# Patient Record
Sex: FEMALE | Race: WHITE | Employment: OTHER | URBAN - METROPOLITAN AREA
[De-identification: names, ages, dates, MRNs, and addresses within clinical notes are randomized per-mention and may not be internally consistent; named-entity substitution may affect disease eponyms.]

---

## 2017-02-20 ENCOUNTER — OFFICE VISIT (OUTPATIENT)
Dept: NEUROLOGY | Facility: CLINIC | Age: 77
End: 2017-02-20

## 2017-02-20 VITALS
WEIGHT: 220 LBS | SYSTOLIC BLOOD PRESSURE: 104 MMHG | BODY MASS INDEX: 36.65 KG/M2 | RESPIRATION RATE: 12 BRPM | DIASTOLIC BLOOD PRESSURE: 74 MMHG | HEIGHT: 65 IN | HEART RATE: 60 BPM

## 2017-02-20 DIAGNOSIS — R41.3 MEMORY DEFICITS: Primary | ICD-10-CM

## 2017-02-20 DIAGNOSIS — R26.9 GAIT DISORDER: ICD-10-CM

## 2017-02-20 DIAGNOSIS — F01.50 VASCULAR DEMENTIA WITHOUT BEHAVIORAL DISTURBANCE (HCC): ICD-10-CM

## 2017-02-20 DIAGNOSIS — E53.8 B12 DEFICIENCY: ICD-10-CM

## 2017-02-20 DIAGNOSIS — R70.0 ESR RAISED: ICD-10-CM

## 2017-02-20 PROCEDURE — 99214 OFFICE O/P EST MOD 30 MIN: CPT | Performed by: OTHER

## 2017-02-20 RX ORDER — DONEPEZIL HYDROCHLORIDE 10 MG/1
TABLET, FILM COATED ORAL
Qty: 180 TABLET | Refills: 3 | Status: SHIPPED | OUTPATIENT
Start: 2017-02-20 | End: 2018-04-10

## 2017-02-20 NOTE — PROGRESS NOTES
Mate[address]27   Neurology; follow up  CLINIC VISIT  2017    Dorothea Genny Patient Status:  No patient class for patient encounter    11/10/1940 MRN [id number]   Location [address] [place] [address] Gustavo Burnett MD     REASON Right     OTHER SURGICAL HISTORY      REPAIR ROTATOR CUFF,ACUTE      Comment right shoulder    APPENDECTOMY      ANGIOPLASTY (CORONARY)  10/24/07    Comment 90% LADp (3.0x16mm Taxus), 90% D1 (2.5x12mm Vision)    ANGIOPLASTY (CORONARY)  10/29/07    Comment Disp:  Rfl:    insulin aspart 100 UNIT/ML Subcutaneous Solution Inject into the skin 3 (three) times daily before meals.  Per SS:130-179=25 mwwpr371-507=24 hshle444-562=10 pvbvk443-492=82 cyide479-004=62 ljvhd368-783=09 units Disp:  Rfl:    furosemide (LASI 220 lb  BMI 36.61 kg/m2   Body mass index is 36.61 kg/(m^2). General:  Patient is a 67year old female in no acute distress. appearance: Normal developed and well nourished , in no acute stress;   HEENT:  Normal conjunctiva, no abnormal secretion,   Neck ENAS    SED SHIREEN FLORES (AUTOMATED)    RHEUMATOID ARTHRITIS FACTOR    Gait disorder    B12 deficiency    Relevant Orders    VITAMIN B12    ESR raised          Impression/Plan:  (R41.3) Memory deficits  (primary encounter diagnosis)    (F01.50) Vascula

## 2017-02-20 NOTE — PATIENT INSTRUCTIONS
Refill policies:    • Allow 2 business days for refills; controlled substances may take longer.   • Contact your pharmacy at least 5 days prior to running out of medication and have them send an electronic request or submit request through the “request re your physician has recommended that you have a procedure or additional testing performed. [facility] will contact your insurance carrier to obtain pre-certification or prior authorization.     Unfortunately, BENITA has seen an increas

## 2017-02-21 ENCOUNTER — TELEPHONE (OUTPATIENT)
Dept: NEUROLOGY | Facility: CLINIC | Age: 77
End: 2017-02-21

## 2017-02-21 NOTE — TELEPHONE ENCOUNTER
Clarified Donepezil dosage and directions per note below with Warner Grimes , pharmacist at [address] [facility]. Verbalized understanding.

## 2017-02-21 NOTE — TELEPHONE ENCOUNTER
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

## 2017-02-22 PROBLEM — R60.9 EDEMA: Status: ACTIVE | Noted: 2017-02-22

## 2017-02-23 ENCOUNTER — TELEPHONE (OUTPATIENT)
Dept: NEUROLOGY | Facility: CLINIC | Age: 77
End: 2017-02-23

## 2017-02-23 NOTE — TELEPHONE ENCOUNTER
Dr. Baron Ramírez reviewed test results performed 2/22/17. With elevated ESR (95) and negative DEEJAY, has recommended follow up with PCP prior to consulting rheumatologist.  ESR was 85 on 7/30/16.     Spoke with patient's daughter, Stacie Salter, and urged her to follow up

## 2017-03-14 ENCOUNTER — HOSPITAL ENCOUNTER (OUTPATIENT)
Dept: CV DIAGNOSTICS | Facility: HOSPITAL | Age: 77
Discharge: HOME | End: 2017-03-14
Attending: INTERNAL MEDICINE
Payer: MEDICARE

## 2017-03-14 DIAGNOSIS — I35.1 NONRHEUMATIC AORTIC VALVE INSUFFICIENCY: ICD-10-CM

## 2017-03-14 DIAGNOSIS — R60.0 LOCALIZED EDEMA: ICD-10-CM

## 2017-03-14 DIAGNOSIS — I50.20 SYSTOLIC CONGESTIVE HEART FAILURE, UNSPECIFIED CONGESTIVE HEART FAILURE CHRONICITY: ICD-10-CM

## 2017-03-14 PROCEDURE — 93306 TTE W/DOPPLER COMPLETE: CPT

## 2017-03-14 PROCEDURE — 93306 TTE W/DOPPLER COMPLETE: CPT | Performed by: INTERNAL MEDICINE

## 2017-06-19 ENCOUNTER — HOSPITAL ENCOUNTER (EMERGENCY)
Facility: HOSPITAL | Age: 77
Discharge: OTHER INSTITUTION | End: 2017-06-20
Attending: EMERGENCY MEDICINE
Payer: MEDICARE

## 2017-06-19 DIAGNOSIS — T83.021A DISLODGED FOLEY CATHETER, INITIAL ENCOUNTER: ICD-10-CM
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
## 2017-06-19 PROCEDURE — 99284 EMERGENCY DEPT VISIT MOD MDM: CPT

## 2017-06-19 PROCEDURE — 87186 SC STD MICRODIL/AGAR DIL: CPT | Performed by: EMERGENCY MEDICINE

## 2017-06-19 PROCEDURE — 87086 URINE CULTURE/COLONY COUNT: CPT | Performed by: EMERGENCY MEDICINE

## 2017-06-19 PROCEDURE — 81001 URINALYSIS AUTO W/SCOPE: CPT | Performed by: EMERGENCY MEDICINE

## 2017-06-19 PROCEDURE — 87077 CULTURE AEROBIC IDENTIFY: CPT | Performed by: EMERGENCY MEDICINE

## 2017-06-19 PROCEDURE — 51702 INSERT TEMP BLADDER CATH: CPT

## 2017-06-20 ENCOUNTER — APPOINTMENT (OUTPATIENT)
Dept: CT IMAGING | Facility: HOSPITAL | Age: 77
End: 2017-06-20
Attending: EMERGENCY MEDICINE
Payer: MEDICARE

## 2017-06-20 VITALS
OXYGEN SATURATION: 96 % | BODY MASS INDEX: 39.99 KG/M2 | TEMPERATURE: 97 F | SYSTOLIC BLOOD PRESSURE: 149 MMHG | RESPIRATION RATE: 16 BRPM | HEART RATE: 52 BPM | HEIGHT: 65 IN | WEIGHT: 240 LBS | DIASTOLIC BLOOD PRESSURE: 82 MMHG

## 2017-06-20 PROCEDURE — 72125 CT NECK SPINE W/O DYE: CPT | Performed by: EMERGENCY MEDICINE

## 2017-06-20 PROCEDURE — 70450 CT HEAD/BRAIN W/O DYE: CPT | Performed by: EMERGENCY MEDICINE

## 2017-06-20 NOTE — ED PROVIDER NOTES
Patient Seen in: [facility] Emergency Department    History   Patient presents with:  Cath Tube Problem (gastrointestinal, urinary, integumentary)    Stated Complaint: mooney catheter issue    HPI    Patient is a 54-year-old with a history of hyperte blood pressure    • High cholesterol    • Coronary atherosclerosis            Past Surgical History    BACK SURGERY      TOTAL ABDOM HYSTERECTOMY      CHOLECYSTECTOMY      KNEE REPLACEMENT SURGERY Right     OTHER SURGICAL HISTORY      REPAIR ROTATOR CUFF,A Tab,  Take 25 mg by mouth nightly. Cholecalciferol (VITAMIN D) 2000 UNITS Oral Cap,  Take 2,000 Units by mouth every morning. Potassium Chloride ER (K-DUR) 10 MEQ Oral Tab CR,  Take 10 mEq by mouth 2 (two) times daily.    Montelukast Sodium (SINGULAIR bilaterally. Abdomen: Soft, nontender, nondistended. Back: No thoracic or lumbar tenderness. Extremities: No edema. Neuro: No facial droop. Patient moves all extremities.     ED Course     Labs Reviewed   URINALYSIS WITH CULTURE REFLEX - Abnormal; No [address] [facility] [address]  [phone]    Schedule an appointment as soon as possible for a visit in 2 days        Medications Prescribed:  Current Discharge Medication List

## 2017-06-20 NOTE — ED NOTES
Report given to Pattie Guerra, [address] [phone] at [address] [facility].

## 2017-06-20 NOTE — ED NOTES
Patient and daughter at bedside updated with plan of care - re: Pt for CT, estimated time for tests and results also relayed. Both verbalized understanding.

## 2017-06-20 NOTE — ED NOTES
Clarified with Pt's daughter at bedside - Pt's mooney cath fell off tonight at 2100 while Pt was in the shower, no trauma/injury or fall tonight. Pt's current bruises were sustained last week when Pt fell.

## 2017-06-20 NOTE — ED NOTES
Discharge instructions discussed with Patient and daughter-Monica at bedside, both verbalized understanding. Both were also updated with KAI VALENCIA. Pt was assisted by this RN and ED PCT-Irene in changing back into her own clothes.

## 2017-08-04 ENCOUNTER — HOSPITAL ENCOUNTER (EMERGENCY)
Facility: HOSPITAL | Age: 77
Discharge: NURSING, REHAB OR LONG-TERM CARE | End: 2017-08-05
Attending: EMERGENCY MEDICINE
Payer: MEDICARE

## 2017-08-04 VITALS
SYSTOLIC BLOOD PRESSURE: 171 MMHG | BODY MASS INDEX: 40 KG/M2 | RESPIRATION RATE: 18 BRPM | OXYGEN SATURATION: 95 % | WEIGHT: 238.13 LBS | DIASTOLIC BLOOD PRESSURE: 67 MMHG | TEMPERATURE: 98 F | HEART RATE: 52 BPM

## 2017-08-04 DIAGNOSIS — T83.9XXA FOLEY CATHETER PROBLEM, INITIAL ENCOUNTER (HCC): Primary | ICD-10-CM

## 2017-08-04 PROCEDURE — 99283 EMERGENCY DEPT VISIT LOW MDM: CPT

## 2017-08-05 NOTE — ED PROVIDER NOTES
Patient Seen in: [facility] Emergency Department    History   Patient presents with:  Cath Tube Problem (gastrointestinal, urinary, integumentary)    Stated Complaint: Catheter problem    HPI    Patient is a 69-year-old female comes in emergency fer date:  APPENDECTOMY  No date: BACK SURGERY  No date: CHOLECYSTECTOMY  No date: HYSTERECTOMY  No date: KNEE REPLACEMENT SURGERY Right  No date: OTHER SURGICAL HISTORY  No date: REPAIR ROTATOR CUFF,ACUTE      Comment: right shoulder  No date: TOTAL ABDOM HYST Sodium (SINGULAIR) 10 MG Oral Tab,  Take 10 mg by mouth nightly. aspirin 325 MG Oral Tab,  Take 325 mg by mouth daily.    insulin glargine (LANTUS) 100 UNIT/ML Subcutaneous Solution,  Inject 45 Units into the skin nightly.     gabapentin (NEURONTIN) 300 M edema.   SKIN EXAMINATIoN: Warm and dry with normal appearance. No rashes or lesions. Patient has chronic stasis dermatitis bilateral lower extremities per  NEUROLOGICAL:  Motor strength intact all groups.   normal sensation, speech intact    ED Course

## 2017-08-11 ENCOUNTER — HOSPITAL ENCOUNTER (EMERGENCY)
Facility: HOSPITAL | Age: 77
Discharge: HOME | End: 2017-08-11
Attending: EMERGENCY MEDICINE
Payer: MEDICARE

## 2017-08-11 VITALS
RESPIRATION RATE: 18 BRPM | DIASTOLIC BLOOD PRESSURE: 72 MMHG | HEART RATE: 92 BPM | OXYGEN SATURATION: 96 % | TEMPERATURE: 98 F | SYSTOLIC BLOOD PRESSURE: 138 MMHG

## 2017-08-11 DIAGNOSIS — N39.0 URINARY TRACT INFECTION WITHOUT HEMATURIA, SITE UNSPECIFIED: ICD-10-CM

## 2017-08-11 DIAGNOSIS — T83.9XXA FOLEY CATHETER PROBLEM, INITIAL ENCOUNTER (HCC): Primary | ICD-10-CM

## 2017-08-11 LAB
BILIRUB UR QL STRIP.AUTO: NEGATIVE
COLOR UR AUTO: YELLOW
GLUCOSE UR STRIP.AUTO-MCNC: 50 MG/DL
HYALINE CASTS #/AREA URNS AUTO: PRESENT /LPF
KETONES UR STRIP.AUTO-MCNC: NEGATIVE MG/DL
NITRITE UR QL STRIP.AUTO: NEGATIVE
PH UR STRIP.AUTO: 6 [PH] (ref 4.5–8)
PROT UR STRIP.AUTO-MCNC: 100 MG/DL
RBC UR QL AUTO: NEGATIVE
SP GR UR STRIP.AUTO: 1.01 (ref 1–1.03)
UROBILINOGEN UR STRIP.AUTO-MCNC: <2 MG/DL
WBC #/AREA URNS AUTO: >50 /HPF

## 2017-08-11 PROCEDURE — 81001 URINALYSIS AUTO W/SCOPE: CPT | Performed by: EMERGENCY MEDICINE

## 2017-08-11 PROCEDURE — 87077 CULTURE AEROBIC IDENTIFY: CPT | Performed by: EMERGENCY MEDICINE

## 2017-08-11 PROCEDURE — 87186 SC STD MICRODIL/AGAR DIL: CPT | Performed by: EMERGENCY MEDICINE

## 2017-08-11 PROCEDURE — 87086 URINE CULTURE/COLONY COUNT: CPT | Performed by: EMERGENCY MEDICINE

## 2017-08-11 PROCEDURE — 99283 EMERGENCY DEPT VISIT LOW MDM: CPT

## 2017-08-11 PROCEDURE — 51702 INSERT TEMP BLADDER CATH: CPT

## 2017-08-11 RX ORDER — CEPHALEXIN 250 MG/1
250 CAPSULE ORAL 2 TIMES DAILY
Qty: 6 CAPSULE | Refills: 0 | Status: SHIPPED | OUTPATIENT
Start: 2017-08-11 | End: 2017-08-14

## 2017-08-11 NOTE — ED PROVIDER NOTES
Patient Seen in: [facility] Emergency Department    History   Patient presents with:  Cath Tube Problem (gastrointestinal, urinary, integumentary)    Stated Complaint: needs mooney cath    HPI    The patient is a 53-year-old female who has had a long REPLACEMENT    Medications :   cephALEXin 250 MG Oral Cap,  Take 1 capsule (250 mg total) by mouth 2 (two) times daily.    CEPHALEXIN 250 MG Oral Cap,  TAKE ONE CAPSULE BY MOUTH PRIOR TO CATHETER CHANGE AND ONE CAPSULE BY MOUTH AFTER CATHETER CHANGE   NOVA Units into the skin nightly.     gabapentin (NEURONTIN) 300 MG Oral Cap,  Take 300 mg by mouth 3 (three) times daily.          Family History   Problem Relation Age of Onset   • Diabetes Father    • Diabetes Sister    • Stroke Son        Smoking status: Meaghan Gómez (*)     Leukocyte Esterase Urine Large (*)     WBC Urine >50 (*)     RBC URINE 3-5 (*)     Bacteria Urine 3+ (*)     Squamous Epi.  Cells Large (*)     Hyaline Casts Present (*)     All other components within normal limits   URINE CULTURE, ROUTINE       ==

## 2017-08-19 DIAGNOSIS — E53.8 B12 DEFICIENCY: ICD-10-CM

## 2017-08-21 RX ORDER — CYANOCOBALAMIN 1000 UG/ML
INJECTION INTRAMUSCULAR; SUBCUTANEOUS
Qty: 1 VIAL | Refills: 11 | Status: SHIPPED | OUTPATIENT
Start: 2017-08-21 | End: 2018-04-10

## 2017-08-21 NOTE — TELEPHONE ENCOUNTER
Lab on 2/22- B12 was at 615 level.      Medication: Vitamin b12 injection    Date of last refill: 11/15/2016  [place]     Last office visit: 2/20/17  Due back to clinic per last office note:  6 months  Date next office vi

## 2017-08-30 PROBLEM — E78.5 DYSLIPIDEMIA: Status: ACTIVE | Noted: 2017-08-30

## 2017-12-18 ENCOUNTER — TELEPHONE (OUTPATIENT)
Dept: NEUROLOGY | Facility: CLINIC | Age: 77
End: 2017-12-18

## 2018-02-12 NOTE — TELEPHONE ENCOUNTER
Sent patient a [employer] message to please make appointment       Medication: Donepezil 10mg     Date of last refill: 2/20/17  [place]     Last office visit: 2/20/17  Due back to clinic per last office note:  6 months  Elias

## 2018-02-20 RX ORDER — DONEPEZIL HYDROCHLORIDE 10 MG/1
TABLET, FILM COATED ORAL
Qty: 60 TABLET | OUTPATIENT
Start: 2018-02-20

## 2018-02-20 NOTE — TELEPHONE ENCOUNTER
Patient has not read [employer] message. Per daughter, Harry Jerez, refill is to go to Dr Lisa Ellington. Medication denied with note to send to PCP.

## 2018-04-10 ENCOUNTER — APPOINTMENT (OUTPATIENT)
Dept: CT IMAGING | Facility: HOSPITAL | Age: 78
DRG: 690 | End: 2018-04-10
Attending: EMERGENCY MEDICINE
Payer: MEDICARE

## 2018-04-10 ENCOUNTER — HOSPITAL ENCOUNTER (INPATIENT)
Facility: HOSPITAL | Age: 78
LOS: 1 days | Discharge: NURSING, REHAB OR LONG-TERM CARE | DRG: 690 | End: 2018-04-11
Attending: EMERGENCY MEDICINE | Admitting: INTERNAL MEDICINE
Payer: MEDICARE

## 2018-04-10 ENCOUNTER — APPOINTMENT (OUTPATIENT)
Dept: GENERAL RADIOLOGY | Facility: HOSPITAL | Age: 78
DRG: 690 | End: 2018-04-10
Attending: EMERGENCY MEDICINE
Payer: MEDICARE

## 2018-04-10 DIAGNOSIS — R53.83 LETHARGY: Primary | ICD-10-CM

## 2018-04-10 DIAGNOSIS — N30.00 ACUTE CYSTITIS WITHOUT HEMATURIA: ICD-10-CM

## 2018-04-10 DIAGNOSIS — E16.1 HYPOGLYCEMIC REACTION: ICD-10-CM

## 2018-04-10 PROBLEM — R79.89 AZOTEMIA: Status: ACTIVE | Noted: 2018-04-10

## 2018-04-10 PROBLEM — R73.9 HYPERGLYCEMIA: Status: ACTIVE | Noted: 2018-04-10

## 2018-04-10 PROBLEM — E87.3 METABOLIC ALKALOSIS: Status: ACTIVE | Noted: 2018-04-10

## 2018-04-10 PROBLEM — E87.2 RESPIRATORY ACIDOSIS: Status: ACTIVE | Noted: 2018-04-10

## 2018-04-10 PROCEDURE — 81015 MICROSCOPIC EXAM OF URINE: CPT | Performed by: EMERGENCY MEDICINE

## 2018-04-10 PROCEDURE — 84132 ASSAY OF SERUM POTASSIUM: CPT | Performed by: EMERGENCY MEDICINE

## 2018-04-10 PROCEDURE — 96365 THER/PROPH/DIAG IV INF INIT: CPT

## 2018-04-10 PROCEDURE — 87040 BLOOD CULTURE FOR BACTERIA: CPT | Performed by: EMERGENCY MEDICINE

## 2018-04-10 PROCEDURE — 84145 PROCALCITONIN (PCT): CPT | Performed by: EMERGENCY MEDICINE

## 2018-04-10 PROCEDURE — 93005 ELECTROCARDIOGRAM TRACING: CPT

## 2018-04-10 PROCEDURE — 36600 WITHDRAWAL OF ARTERIAL BLOOD: CPT | Performed by: EMERGENCY MEDICINE

## 2018-04-10 PROCEDURE — 70450 CT HEAD/BRAIN W/O DYE: CPT | Performed by: EMERGENCY MEDICINE

## 2018-04-10 PROCEDURE — 83605 ASSAY OF LACTIC ACID: CPT | Performed by: EMERGENCY MEDICINE

## 2018-04-10 PROCEDURE — 82962 GLUCOSE BLOOD TEST: CPT

## 2018-04-10 PROCEDURE — 93010 ELECTROCARDIOGRAM REPORT: CPT

## 2018-04-10 PROCEDURE — 85610 PROTHROMBIN TIME: CPT | Performed by: EMERGENCY MEDICINE

## 2018-04-10 PROCEDURE — 83050 HGB METHEMOGLOBIN QUAN: CPT | Performed by: EMERGENCY MEDICINE

## 2018-04-10 PROCEDURE — 87186 SC STD MICRODIL/AGAR DIL: CPT | Performed by: EMERGENCY MEDICINE

## 2018-04-10 PROCEDURE — 85730 THROMBOPLASTIN TIME PARTIAL: CPT | Performed by: EMERGENCY MEDICINE

## 2018-04-10 PROCEDURE — 82803 BLOOD GASES ANY COMBINATION: CPT | Performed by: EMERGENCY MEDICINE

## 2018-04-10 PROCEDURE — 99285 EMERGENCY DEPT VISIT HI MDM: CPT

## 2018-04-10 PROCEDURE — 81001 URINALYSIS AUTO W/SCOPE: CPT | Performed by: EMERGENCY MEDICINE

## 2018-04-10 PROCEDURE — 82330 ASSAY OF CALCIUM: CPT | Performed by: EMERGENCY MEDICINE

## 2018-04-10 PROCEDURE — 87086 URINE CULTURE/COLONY COUNT: CPT | Performed by: EMERGENCY MEDICINE

## 2018-04-10 PROCEDURE — 85025 COMPLETE CBC W/AUTO DIFF WBC: CPT | Performed by: EMERGENCY MEDICINE

## 2018-04-10 PROCEDURE — 84295 ASSAY OF SERUM SODIUM: CPT | Performed by: EMERGENCY MEDICINE

## 2018-04-10 PROCEDURE — 82375 ASSAY CARBOXYHB QUANT: CPT | Performed by: EMERGENCY MEDICINE

## 2018-04-10 PROCEDURE — 71045 X-RAY EXAM CHEST 1 VIEW: CPT | Performed by: EMERGENCY MEDICINE

## 2018-04-10 PROCEDURE — 87077 CULTURE AEROBIC IDENTIFY: CPT | Performed by: EMERGENCY MEDICINE

## 2018-04-10 PROCEDURE — 80053 COMPREHEN METABOLIC PANEL: CPT | Performed by: EMERGENCY MEDICINE

## 2018-04-10 PROCEDURE — 85018 HEMOGLOBIN: CPT | Performed by: EMERGENCY MEDICINE

## 2018-04-10 RX ORDER — SIMVASTATIN 20 MG
10 TABLET ORAL NIGHTLY
COMMUNITY
End: 2018-05-03

## 2018-04-10 RX ORDER — CARVEDILOL 6.25 MG/1
6.25 TABLET ORAL 2 TIMES DAILY WITH MEALS
Status: DISCONTINUED | OUTPATIENT
Start: 2018-04-10 | End: 2018-04-12

## 2018-04-10 RX ORDER — SODIUM CHLORIDE 9 MG/ML
INJECTION, SOLUTION INTRAVENOUS CONTINUOUS
Status: DISCONTINUED | OUTPATIENT
Start: 2018-04-10 | End: 2018-04-12

## 2018-04-10 RX ORDER — DONEPEZIL HYDROCHLORIDE 10 MG/1
20 TABLET, FILM COATED ORAL NIGHTLY
Status: DISCONTINUED | OUTPATIENT
Start: 2018-04-10 | End: 2018-04-12

## 2018-04-10 RX ORDER — FUROSEMIDE 40 MG/1
40 TABLET ORAL
Status: DISCONTINUED | OUTPATIENT
Start: 2018-04-10 | End: 2018-04-12

## 2018-04-10 RX ORDER — LISINOPRIL 5 MG/1
5 TABLET ORAL DAILY
Status: DISCONTINUED | OUTPATIENT
Start: 2018-04-11 | End: 2018-04-11

## 2018-04-10 RX ORDER — ASPIRIN 325 MG
325 TABLET ORAL DAILY
Status: DISCONTINUED | OUTPATIENT
Start: 2018-04-11 | End: 2018-04-12

## 2018-04-10 RX ORDER — SERTRALINE HYDROCHLORIDE 25 MG/1
25 TABLET, FILM COATED ORAL 2 TIMES DAILY
Status: DISCONTINUED | OUTPATIENT
Start: 2018-04-10 | End: 2018-04-12

## 2018-04-10 RX ORDER — DONEPEZIL HYDROCHLORIDE 10 MG/1
20 TABLET, FILM COATED ORAL NIGHTLY
COMMUNITY
End: 2018-05-03

## 2018-04-10 RX ORDER — PANTOPRAZOLE SODIUM 20 MG/1
20 TABLET, DELAYED RELEASE ORAL
Status: DISCONTINUED | OUTPATIENT
Start: 2018-04-11 | End: 2018-04-12

## 2018-04-10 RX ORDER — ACETAMINOPHEN 500 MG
1000 TABLET ORAL EVERY 6 HOURS PRN
COMMUNITY
End: 2018-05-03

## 2018-04-10 RX ORDER — GABAPENTIN 300 MG/1
300 CAPSULE ORAL 3 TIMES DAILY
Status: DISCONTINUED | OUTPATIENT
Start: 2018-04-10 | End: 2018-04-12

## 2018-04-10 RX ORDER — SODIUM CHLORIDE 9 MG/ML
INJECTION, SOLUTION INTRAVENOUS ONCE
Status: COMPLETED | OUTPATIENT
Start: 2018-04-10 | End: 2018-04-10

## 2018-04-10 RX ORDER — PRAVASTATIN SODIUM 10 MG
10 TABLET ORAL NIGHTLY
Status: DISCONTINUED | OUTPATIENT
Start: 2018-04-10 | End: 2018-04-12

## 2018-04-10 RX ORDER — ACETAMINOPHEN 160 MG
2000 TABLET,DISINTEGRATING ORAL EVERY MORNING
Status: DISCONTINUED | OUTPATIENT
Start: 2018-04-11 | End: 2018-04-12

## 2018-04-10 RX ORDER — METHENAMINE HIPPURATE 1000 MG/1
1 TABLET ORAL DAILY
COMMUNITY
End: 2018-12-03

## 2018-04-10 RX ORDER — HEPARIN SODIUM 5000 [USP'U]/ML
5000 INJECTION, SOLUTION INTRAVENOUS; SUBCUTANEOUS EVERY 12 HOURS SCHEDULED
Status: DISCONTINUED | OUTPATIENT
Start: 2018-04-10 | End: 2018-04-12

## 2018-04-10 RX ORDER — OXYBUTYNIN CHLORIDE 5 MG/1
5 TABLET, EXTENDED RELEASE ORAL DAILY
Status: DISCONTINUED | OUTPATIENT
Start: 2018-04-11 | End: 2018-04-12

## 2018-04-10 RX ORDER — SODIUM CHLORIDE 9 MG/ML
INJECTION, SOLUTION INTRAVENOUS CONTINUOUS
Status: ACTIVE | OUTPATIENT
Start: 2018-04-10 | End: 2018-04-10

## 2018-04-10 RX ORDER — IBUPROFEN 200 MG
1 CAPSULE ORAL 3 TIMES DAILY PRN
COMMUNITY
End: 2018-05-03

## 2018-04-10 RX ORDER — NYSTATIN 100000 [USP'U]/G
1 POWDER TOPICAL 2 TIMES DAILY
COMMUNITY
End: 2018-09-10

## 2018-04-10 RX ORDER — ONDANSETRON 2 MG/ML
4 INJECTION INTRAMUSCULAR; INTRAVENOUS EVERY 4 HOURS PRN
Status: DISCONTINUED | OUTPATIENT
Start: 2018-04-10 | End: 2018-04-12

## 2018-04-10 RX ORDER — POTASSIUM CHLORIDE 750 MG/1
10 TABLET, EXTENDED RELEASE ORAL 2 TIMES DAILY
Status: DISCONTINUED | OUTPATIENT
Start: 2018-04-10 | End: 2018-04-12

## 2018-04-10 RX ORDER — ACETAMINOPHEN 500 MG
1000 TABLET ORAL EVERY 6 HOURS PRN
Status: DISCONTINUED | OUTPATIENT
Start: 2018-04-10 | End: 2018-04-12

## 2018-04-10 RX ORDER — NITROFURANTOIN 25; 75 MG/1; MG/1
100 CAPSULE ORAL NIGHTLY
COMMUNITY
End: 2018-04-10

## 2018-04-10 NOTE — ED INITIAL ASSESSMENT (HPI)
Pt from [facility] called for low blood sugar of 48 was given d50 then HR also in the 40's first degree HB pt is lethargic.  Pt arrives with mooney and pt urine is purulent

## 2018-04-10 NOTE — ED PROVIDER NOTES
Patient Seen in: [facility] Emergency Department    History   Patient presents with:  Arrythmia/Palpitations (cardiovascular)  Hypoglycemia (metabolic)    Stated Complaint: hypoglycemia/HR    HPI    Patient is a 69-year-old female nursing home resid Taxus), 90% D1 (2.5x12mm                Vision)  10/29/07: ANGIOPLASTY (CORONARY)      Comment: 100% diagonal, unable to wire  No date: APPENDECTOMY  No date: BACK SURGERY  No date: CHOLECYSTECTOMY  No date: HYSTERECTOMY  No date: KNEE REPLACEMENT SURGERY colored, cloudy urine.     ED Course     Labs Reviewed   COMP METABOLIC PANEL (14) - Abnormal; Notable for the following:        Result Value    Glucose 147 (*)     BUN 35 (*)     Creatinine 1.71 (*)     GFR, Non- 28 (*)     GFR, -Nicolasa has been validated against 0.3-0.7 heparin anti-Xa units/mL. PROCALCITONIN - Normal   CBC WITH DIFFERENTIAL WITH PLATELET    Narrative: The following orders were created for panel order CBC WITH DIFFERENTIAL WITH PLATELET.   Procedure fracture or osseous abnormality. OTHER:             None. CONCLUSION:  No acute intracranial abnormality is seen. If clinical symptoms persist then consider MRI. Probable mild chronic microvascular ischemic changes.     Dictated by: Renetta Santos MD on hydration and monitoring was recommended. Patient has a urinalysis consistent with UTI, however, the patient has a chronic indwelling Urbina catheter. Cultures pending.   Consideration of acute infection contributing to the patient's lethargy was considere

## 2018-04-11 VITALS
TEMPERATURE: 98 F | BODY MASS INDEX: 31.64 KG/M2 | HEART RATE: 74 BPM | DIASTOLIC BLOOD PRESSURE: 45 MMHG | SYSTOLIC BLOOD PRESSURE: 155 MMHG | OXYGEN SATURATION: 94 % | WEIGHT: 189.88 LBS | RESPIRATION RATE: 18 BRPM | HEIGHT: 65 IN

## 2018-04-11 PROCEDURE — 97530 THERAPEUTIC ACTIVITIES: CPT

## 2018-04-11 PROCEDURE — 97162 PT EVAL MOD COMPLEX 30 MIN: CPT

## 2018-04-11 PROCEDURE — 82962 GLUCOSE BLOOD TEST: CPT

## 2018-04-11 PROCEDURE — 85025 COMPLETE CBC W/AUTO DIFF WBC: CPT | Performed by: INTERNAL MEDICINE

## 2018-04-11 PROCEDURE — 97165 OT EVAL LOW COMPLEX 30 MIN: CPT

## 2018-04-11 PROCEDURE — 80048 BASIC METABOLIC PNL TOTAL CA: CPT | Performed by: INTERNAL MEDICINE

## 2018-04-11 RX ORDER — LISINOPRIL 5 MG/1
5 TABLET ORAL DAILY
Status: DISCONTINUED | OUTPATIENT
Start: 2018-04-11 | End: 2018-04-12

## 2018-04-11 RX ORDER — INSULIN ASPART 100 [IU]/ML
INJECTION, SOLUTION INTRAVENOUS; SUBCUTANEOUS
Refills: 0 | Status: ON HOLD | COMMUNITY
Start: 2018-04-11 | End: 2018-05-05

## 2018-04-11 NOTE — CM/SW NOTE
MD orders received for return to [facility] today.  ALEE spoke with [facility] RN, Stephanie Villarreal to confirm facility is able to accommodate pt's need for 2-person assist. Stephanie Villarreal confirms facility is able to accommodate pt's needs and states pt has been requiri

## 2018-04-11 NOTE — PROGRESS NOTES
Multidisciplinary Discharge Rounds held 4/11/2018. Treatment team members present today include , , Charge Nurse,  Nurse, RT, PT and Pharmacy caring for [employer].      Other care providers present:    Patient Active Proble

## 2018-04-11 NOTE — OCCUPATIONAL THERAPY NOTE
OCCUPATIONAL THERAPY QUICK EVALUATION - INPATIENT    Room Number: 589/894-J  Evaluation Date: 4/11/2018     Type of Evaluation: Initial and Quick Eval  Presenting Problem: UTI    Physician Order: IP Consult to Occupational Therapy  Reason for Therapy:  ADL date:  REPAIR ROTATOR CUFF,ACUTE      Comment: right shoulder  No date: TOTAL ABDOM HYSTERECTOMY  No date: TOTAL KNEE REPLACEMENT    OCCUPATIONAL PROFILE    HOME SITUATION  Type of Home: Assisted living facility ([facility])  Home Layout: CL    FUNCTIONAL TRANSFER ASSESSMENT  Supine to Sit : Minimum assistance  Sit to Stand: Dependent assistance    Skilled Therapy Provided: Pt seen semi supine. Min (A) to EOB. Able to follow commands consistently to facilitate ADL. Total (A) LB dressing.  Co goals:    Patient able to dress lower extremities: at previous functional level  Patient/Caregiver able to demonstrate safety with ADLS: at previous functional level

## 2018-04-11 NOTE — PROGRESS NOTES
NURSING DISCHARGE NOTE    Discharged Nursing home via Ambulance. Accompanied by Support staff  Belongings Taken by patient/family. Pt discharged. IV removed. Daughter called with update. All belongings taken with patient.  All questions and concerns

## 2018-04-11 NOTE — PHYSICAL THERAPY NOTE
PHYSICAL THERAPY EVALUATION - INPATIENT     Room Number: 808/371-D  Evaluation Date: 4/11/2018  Type of Evaluation: Initial  Physician Order: PT Eval and Treat    Presenting Problem: AMS, hypoglycemia, acute cystitis  Reason for Therapy: Mobility Dys REPLACEMENT    HOME SITUATION  Type of Home: Assisted living facility (Memory care at [facility])   Home Layout: One level  Stairs to Enter : 0     Stairs to Bedroom: 0       Lives With: Staff 24 hours  Drives: No  Patient Owned Equipment: Other (Comme Approx Degree of Impairment Score: 92.36%   Standardized Score (AM-PAC Scale): 26.42   CMS Modifier (G-Code): CM    FUNCTIONAL ABILITY STATUS  Gait Assessment   Gait Assistance: Other (Comment) (unable; non ambulatory)           Stoop/Curb Assistance: Not mobility, transfers, and gait. The patient is below baseline and would benefit from skilled inpatient PT to address the above deficits to assist patient in returning to prior to level of function.   DISCHARGE RECOMMENDATIONS  PT Discharge Recommendations:

## 2018-04-11 NOTE — PROGRESS NOTES
04/11/18 0925   Clinical Encounter Type   Referral To   ( electronically filed POLST into pts chart.   chaplain fraziera available at pager [phone].)

## 2018-04-11 NOTE — CM/SW NOTE
04/11/18 1000   CM/SW Referral Data   Referral Source Social Work (self-referral)   Reason for Referral Discharge planning   Informant Patient   Pertinent Medical Hx   Primary Care Physician Name Hua Trevino MD)   Patient Info   Patient's Mental Statu

## 2018-04-11 NOTE — H&P
[address] Patient Status:  Inpatient    11/10/1940 MRN [id number]   [facility] 5NW-A Attending Katherin Condon MD   [address] Day # 1 PCP Angie Pierce MD     History of Present Illness:  Elizabeth Lopes HYSTERECTOMY  No date: TOTAL KNEE REPLACEMENT  Family History   Problem Relation Age of Onset   • Stroke Son    • Diabetes Father    • Diabetes Sister       reports that she has never smoked.  She has never used smokeless tobacco. She reports that she does Magnesium 40 MG Oral Capsule Delayed Release Take 40 mg by mouth every morning before breakfast. Disp:  Rfl:  4/10/2018 at 0900   lisinopril 5 MG Oral Tab Take 5 mg by mouth daily.  Disp:  Rfl:  4/10/2018 at 0900   insulin aspart 100 UNIT/ML Subcutaneous So Abdomen: Soft, nontender, nondistended. Positive bowel sounds. No rebound tenderness  Neurologic: No focal neurological deficits. Musculoskeletal: Full range of motion of all extremities. No swelling noted. Integument: No lesions. No erythema.   Psych acute intracranial abnormality is seen. If clinical symptoms persist then consider MRI. Probable mild chronic microvascular ischemic changes.     Dictated by: Rishi Prather MD on 4/10/2018 at 19:34     Approved by: Rishi Prather MD            Xr Chest Ap Portab Pyelonephritis     Acute urinary retention     Urinary tract infection without hematuria     Urinary tract infection without hematuria, site unspecified     Dyspnea, unspecified type     ESR raised     Edema     Dyslipidemia     Azotemia     Metabolic lj

## 2018-04-12 NOTE — CM/SW NOTE
Patient discharged on 4/11/18 as previously planned.        04/12/18 0800   Discharge disposition   Expected discharge disposition [facility]   Name of Facillity/Home Care/Hospice [address]   Discharge transportation [employer]

## 2018-05-03 ENCOUNTER — HOSPITAL ENCOUNTER (INPATIENT)
Facility: HOSPITAL | Age: 78
LOS: 1 days | Discharge: NURSING, REHAB OR LONG-TERM CARE | DRG: 638 | End: 2018-05-05
Attending: EMERGENCY MEDICINE | Admitting: INTERNAL MEDICINE
Payer: MEDICARE

## 2018-05-03 DIAGNOSIS — R00.1 BRADYCARDIA: ICD-10-CM

## 2018-05-03 DIAGNOSIS — E16.1 HYPOGLYCEMIC REACTION: Primary | ICD-10-CM

## 2018-05-03 PROCEDURE — 87086 URINE CULTURE/COLONY COUNT: CPT | Performed by: EMERGENCY MEDICINE

## 2018-05-03 PROCEDURE — 83036 HEMOGLOBIN GLYCOSYLATED A1C: CPT | Performed by: INTERNAL MEDICINE

## 2018-05-03 PROCEDURE — 82962 GLUCOSE BLOOD TEST: CPT

## 2018-05-03 PROCEDURE — 93010 ELECTROCARDIOGRAM REPORT: CPT

## 2018-05-03 PROCEDURE — 96372 THER/PROPH/DIAG INJ SC/IM: CPT

## 2018-05-03 PROCEDURE — 99285 EMERGENCY DEPT VISIT HI MDM: CPT

## 2018-05-03 PROCEDURE — 80053 COMPREHEN METABOLIC PANEL: CPT | Performed by: EMERGENCY MEDICINE

## 2018-05-03 PROCEDURE — 96374 THER/PROPH/DIAG INJ IV PUSH: CPT

## 2018-05-03 PROCEDURE — 87186 SC STD MICRODIL/AGAR DIL: CPT | Performed by: EMERGENCY MEDICINE

## 2018-05-03 PROCEDURE — 81001 URINALYSIS AUTO W/SCOPE: CPT | Performed by: EMERGENCY MEDICINE

## 2018-05-03 PROCEDURE — 87088 URINE BACTERIA CULTURE: CPT | Performed by: EMERGENCY MEDICINE

## 2018-05-03 PROCEDURE — 85025 COMPLETE CBC W/AUTO DIFF WBC: CPT | Performed by: EMERGENCY MEDICINE

## 2018-05-03 PROCEDURE — 93005 ELECTROCARDIOGRAM TRACING: CPT

## 2018-05-03 PROCEDURE — 36415 COLL VENOUS BLD VENIPUNCTURE: CPT

## 2018-05-03 RX ORDER — ACETAMINOPHEN 325 MG/1
1000 TABLET ORAL EVERY 6 HOURS PRN
Status: ON HOLD | COMMUNITY
End: 2019-01-01

## 2018-05-03 RX ORDER — DONEPEZIL HYDROCHLORIDE 10 MG/1
20 TABLET, FILM COATED ORAL NIGHTLY
Status: DISCONTINUED | OUTPATIENT
Start: 2018-05-03 | End: 2018-05-05

## 2018-05-03 RX ORDER — CITALOPRAM 20 MG/1
20 TABLET ORAL DAILY
Status: ON HOLD | COMMUNITY
End: 2018-08-27

## 2018-05-03 RX ORDER — MELATONIN
1000 DAILY
Status: ON HOLD | COMMUNITY
End: 2018-08-27

## 2018-05-03 RX ORDER — PANTOPRAZOLE SODIUM 20 MG/1
20 TABLET, DELAYED RELEASE ORAL
Status: DISCONTINUED | OUTPATIENT
Start: 2018-05-04 | End: 2018-05-05

## 2018-05-03 RX ORDER — ASPIRIN 325 MG
325 TABLET ORAL NIGHTLY
Status: DISCONTINUED | OUTPATIENT
Start: 2018-05-03 | End: 2018-05-05

## 2018-05-03 RX ORDER — OMEPRAZOLE 20 MG/1
20 CAPSULE, DELAYED RELEASE ORAL
Status: ON HOLD | COMMUNITY
End: 2018-08-27

## 2018-05-03 RX ORDER — HYDRALAZINE HYDROCHLORIDE 20 MG/ML
10 INJECTION INTRAMUSCULAR; INTRAVENOUS EVERY 6 HOURS PRN
Status: DISCONTINUED | OUTPATIENT
Start: 2018-05-03 | End: 2018-05-05

## 2018-05-03 RX ORDER — HYDRALAZINE HYDROCHLORIDE 20 MG/ML
10 INJECTION INTRAMUSCULAR; INTRAVENOUS ONCE
Status: COMPLETED | OUTPATIENT
Start: 2018-05-03 | End: 2018-05-03

## 2018-05-03 RX ORDER — DONEPEZIL HYDROCHLORIDE 10 MG/1
20 TABLET, FILM COATED ORAL NIGHTLY
COMMUNITY

## 2018-05-03 RX ORDER — ACETAMINOPHEN 325 MG/1
650 TABLET ORAL EVERY 6 HOURS PRN
Status: DISCONTINUED | OUTPATIENT
Start: 2018-05-03 | End: 2018-05-05

## 2018-05-03 RX ORDER — TOLTERODINE 4 MG/1
4 CAPSULE, EXTENDED RELEASE ORAL DAILY
Status: ON HOLD | COMMUNITY
End: 2018-05-05

## 2018-05-03 RX ORDER — GABAPENTIN 300 MG/1
300 CAPSULE ORAL 3 TIMES DAILY
Status: DISCONTINUED | OUTPATIENT
Start: 2018-05-03 | End: 2018-05-05

## 2018-05-03 RX ORDER — SIMVASTATIN 20 MG
20 TABLET ORAL NIGHTLY
Status: ON HOLD | COMMUNITY
End: 2019-01-01

## 2018-05-03 RX ORDER — ASCORBIC ACID 500 MG
500 TABLET ORAL DAILY
Status: ON HOLD | COMMUNITY
End: 2018-08-27

## 2018-05-03 RX ORDER — LISINOPRIL 10 MG/1
10 TABLET ORAL DAILY
Status: DISCONTINUED | OUTPATIENT
Start: 2018-05-03 | End: 2018-05-04

## 2018-05-03 RX ORDER — LISINOPRIL 5 MG/1
5 TABLET ORAL DAILY
Status: DISCONTINUED | OUTPATIENT
Start: 2018-05-03 | End: 2018-05-03

## 2018-05-03 RX ORDER — HEPARIN SODIUM 5000 [USP'U]/ML
5000 INJECTION, SOLUTION INTRAVENOUS; SUBCUTANEOUS EVERY 12 HOURS SCHEDULED
Status: DISCONTINUED | OUTPATIENT
Start: 2018-05-03 | End: 2018-05-05

## 2018-05-03 RX ORDER — ESCITALOPRAM OXALATE 10 MG/1
10 TABLET ORAL EVERY MORNING
Status: DISCONTINUED | OUTPATIENT
Start: 2018-05-04 | End: 2018-05-05

## 2018-05-03 NOTE — ED INITIAL ASSESSMENT (HPI)
Medics called to scene with unresponsive patient, blood glucose 39 and heart rate was 41. 1 amp D50 given on scene. Pt is sleeping but arousable to touch and questions.  Alert to self only

## 2018-05-03 NOTE — ED PROVIDER NOTES
Patient Seen in: [facility] Emergency Department    History   Patient presents with:  Hypoglycemia (metabolic)  Arrythmia/Palpitations (cardiovascular)    Stated Complaint: hypoglycemia, low HR    HPI    42-year-old female presents emergency departm SURGERY  No date: CHOLECYSTECTOMY  No date: HYSTERECTOMY  No date: KNEE REPLACEMENT SURGERY Right  No date: OTHER SURGICAL HISTORY  No date: REPAIR ROTATOR CUFF,ACUTE      Comment: right shoulder  No date: TOTAL ABDOM HYSTERECTOMY  No date: TOTAL KNEE REPL (*)     BUN 43 (*)     Creatinine 1.69 (*)     GFR, Non- 29 (*)     GFR, -American 33 (*)     Alt 9 (*)     Albumin 2.5 (*)     Sodium 149 (*)     Chloride 112 (*)     All other components within normal limits   URINALYSIS WITH CULTU be admitted for observation just to make sure her sugar stabilizes. May need some further education about diabetic control. Also may need to think about hospice as patient is starting to definitely had downhill and doing poorly.   Did discuss this with pa

## 2018-05-04 PROCEDURE — 97112 NEUROMUSCULAR REEDUCATION: CPT

## 2018-05-04 PROCEDURE — 80061 LIPID PANEL: CPT | Performed by: INTERNAL MEDICINE

## 2018-05-04 PROCEDURE — 82962 GLUCOSE BLOOD TEST: CPT

## 2018-05-04 PROCEDURE — 85025 COMPLETE CBC W/AUTO DIFF WBC: CPT | Performed by: INTERNAL MEDICINE

## 2018-05-04 PROCEDURE — 80048 BASIC METABOLIC PNL TOTAL CA: CPT | Performed by: INTERNAL MEDICINE

## 2018-05-04 PROCEDURE — 97162 PT EVAL MOD COMPLEX 30 MIN: CPT

## 2018-05-04 RX ORDER — LISINOPRIL 20 MG/1
20 TABLET ORAL 2 TIMES DAILY
Status: DISCONTINUED | OUTPATIENT
Start: 2018-05-04 | End: 2018-05-05

## 2018-05-04 RX ORDER — LISINOPRIL 10 MG/1
10 TABLET ORAL 2 TIMES DAILY
Status: DISCONTINUED | OUTPATIENT
Start: 2018-05-04 | End: 2018-05-04

## 2018-05-04 RX ORDER — LISINOPRIL 10 MG/1
10 TABLET ORAL ONCE
Status: COMPLETED | OUTPATIENT
Start: 2018-05-04 | End: 2018-05-04

## 2018-05-04 RX ORDER — FUROSEMIDE 40 MG/1
40 TABLET ORAL 2 TIMES DAILY
Status: DISCONTINUED | OUTPATIENT
Start: 2018-05-04 | End: 2018-05-05

## 2018-05-04 RX ORDER — POTASSIUM CHLORIDE 750 MG/1
10 TABLET, EXTENDED RELEASE ORAL 2 TIMES DAILY
Status: DISCONTINUED | OUTPATIENT
Start: 2018-05-04 | End: 2018-05-05

## 2018-05-04 RX ORDER — HYDRALAZINE HYDROCHLORIDE 25 MG/1
25 TABLET, FILM COATED ORAL EVERY 8 HOURS SCHEDULED
Status: DISCONTINUED | OUTPATIENT
Start: 2018-05-04 | End: 2018-05-04

## 2018-05-04 RX ORDER — DEXTROSE MONOHYDRATE 25 G/50ML
50 INJECTION, SOLUTION INTRAVENOUS
Status: DISCONTINUED | OUTPATIENT
Start: 2018-05-04 | End: 2018-05-05

## 2018-05-04 NOTE — ED NOTES
Pt is currently awake, smiling at this time.  Report called to [id number] for bed [phone]

## 2018-05-04 NOTE — PROGRESS NOTES
NURSING ADMISSION NOTE      Patient admitted via Cart  Oriented to room. Safety precautions initiated. Bed in low position. Call light in reach. Called pt's daughter ambrocio for information. Pt is alert x3. Poor historian. Tremors present.  Angelo Lam

## 2018-05-04 NOTE — PHYSICAL THERAPY NOTE
Attempted to see patient for PT evaluation. Pt currently hypertensive (PGD=562), awaiting IV medication per RN. Will re-attempt later today as able/appropriate.

## 2018-05-04 NOTE — PHYSICAL THERAPY NOTE
PHYSICAL THERAPY EVALUATION - INPATIENT     Room Number: 0967/0286-C  Evaluation Date: 5/4/2018  Type of Evaluation: Initial  Physician Order: PT Eval and Treat    Presenting Problem: hypoglycemia, bradycardia  Reason for Therapy: Mobility Dysfunctio REPLACEMENT SURGERY Right  No date: OTHER SURGICAL HISTORY  No date: REPAIR ROTATOR CUFF,ACUTE      Comment: right shoulder  No date: TOTAL ABDOM HYSTERECTOMY  No date: TOTAL KNEE REPLACEMENT    HOME SITUATION  Type of Home: Assisted living facility   Home Unable   -   Sitting down on and standing up from a chair with arms (e.g., wheelchair, bedside commode, etc.): Unable   -   Moving from lying on back to sitting on the side of the bed?: Unable   How much help from another person does the patient currently patient questions and concerns addressed    ASSESSMENT   Patient is a 65year old female admitted on 5/3/2018 for hypoglycemia and bradycardia.   Pertinent comorbidities and personal factors impacting therapy include: advanced dementia, chronic mooney, DM II assistance     Goal #3 Stand pivot TBA if appropriate   Goal #4    Goal #5    Goal #6    Goal Comments: Goals established on 5/4/2018

## 2018-05-04 NOTE — PLAN OF CARE
Denies chest pain, sob, ligtheadedness, dizziness. Pt is a/o 1-2. Pleasantly confused. eating this am. If pt left alone to eat she will not continue. Blood sugar covered per orders.  Dr. Tay Cool aware and placed endocrine on consult, per daughters Brendan Ward

## 2018-05-04 NOTE — CONSULTS
ENDOCRINOLOGY CONSULTATION    Attending physician:  Nette Samuel MD  Consulting physican:  Alton Mckeon MD    Admission Date:  5/3/2018  Consultation Date:  5/4/2018      Reason for consultation: Mgmt of Type 2 DM    Chief Complaint:  Admitted f • CONGESTIVE HEART FAILURE     EF 35% 2007, EF 55% 2009   • COPD    • CORONARY ARTERY DISEASE 2007    ALEKS to LAD, BMS to diagonal   • Coronary atherosclerosis    • Dementia    • History of blood transfusion     50 years ago   • Hyperlipidemia LDL goal < by mouth 2 (two) times daily. Disp:  Rfl:    Cholecalciferol (VITAMIN D) 2000 UNITS Oral Cap Take 2,000 Units by mouth every morning. Disp:  Rfl:    Potassium Chloride ER (K-DUR) 10 MEQ Oral Tab CR Take 10 mEq by mouth 2 (two) times daily.  Disp:  Rfl: hydrALAzine HCl (APRESOLINE) injection 10 mg 10 mg Intravenous Q6H PRN         Allergies:    Ciprofloxacin           Dyspnea  Pcn [Penicillins]       HIVES    Comment:Tolerated cephalosporins 2/12/2014  Sulfa Antibiotics       HIVES        Social History Units 5/4/2018   BUN      8 - 20 mg/dL 40 (H)   CREATININE      0.55 - 1.02 mg/dL 1.42 (H)   GFR, Non-      >=60 35 (L)   GFR, -American      >=60 41 (L)   CALCIUM      8.3 - 10.3 mg/dL 8.9   Sodium      136 - 144 mmol/L 146 (H)   Po

## 2018-05-04 NOTE — CONSULTS
[facility]  Report of Consultation    Antoni Hebert Patient Status:  Inpatient    11/10/1940 MRN [id number]   [facility] 2NE-A Attending Singh Bruce MD   Hosp Day # 0 PCP Fidel Shields MD     Reason for Consultation:  bradycardia wire  CHF-echo 2007 EF 35% with apical akinesis, echo 2009 EF 55% with apical akinesis  Aortic insufficiency-mild to moderate on echo 2009, mild 2017  Aortic stenosis-very mild 2017      History:  Past Medical History:   Diagnosis Date   • Aortic insuffici Facility-Administered Medications:   •  lisinopril (PRINIVIL,ZESTRIL) tab 20 mg, 20 mg, Oral, BID  •  furosemide (LASIX) tab 40 mg, 40 mg, Oral, BID  •  Potassium Chloride ER (K-DUR) CR tab 10 mEq, 10 mEq, Oral, BID  •  acetaminophen (TYLENOL) tab 650 mg, 05/03/18  1706 05/04/18  0557   WBC 9.7 11.3   HGB 11.2* 12.1   MCV 90.3 91.5   .0 195.0       Recent Labs   05/03/18  1706 05/04/18  0557   * 146*   K 4.1 4.1   * 110   CO2 28.0 27.0   BUN 43* 40*   CREATSERUM 1.69* 1.42*   CA 8.9 8.9

## 2018-05-04 NOTE — H&P
[address] Patient Status:  Observation    11/10/1940 MRN [id number]   [facility] 2NE-A Attending Madyson Mcgill MD   Hosp Day # 0 PCP Estephania Solis MD     History of Present Illness:  Miguel Barron APPENDECTOMY  No date: BACK SURGERY  No date: CATH DRUG ELUTING STENT  No date: CHOLECYSTECTOMY  No date: HYSTERECTOMY  No date: KNEE REPLACEMENT SURGERY Right  No date: OTHER SURGICAL HISTORY  No date: REPAIR ROTATOR CUFF,ACUTE      Comment: right shoulde wvlry426-241=45 spwzt320-788=27 imxno180-163=59 tsqdz985-918=57 aslrr213-184=01 units  Disp:  Rfl: 0 5/3/2018 at 0900   Sertraline HCl 50 MG Oral Tab Take 25 mg by mouth 2 (two) times daily.  Disp:  Rfl:  5/3/2018 at 0900   Methenamine Hippurate 1 g Oral Ta bruits. Respiratory: Clear to auscultation bilaterally. No wheezes. No rhonchi. Cardiovascular: S1, S2.  Regular rate and rhythm. No murmurs. Equal pulses   Abdomen: Soft, nontender, nondistended. Positive bowel sounds.  No rebound tenderness  Neurolog for fracture or osseous abnormality. OTHER:             None. CONCLUSION:  No acute intracranial abnormality is seen. If clinical symptoms persist then consider MRI. Probable mild chronic microvascular ischemic changes.     Dictated by: Evita Kathleen MD collapse     Congestive heart failure (HCC)     Hypervolemia     Type 2 diabetes mellitus without complication (HCC)     Hematuria     Renal insufficiency     Memory deficits     Dementia     Gait disorder     B12 deficiency     Pyelonephritis     Acute ur

## 2018-05-05 VITALS
HEIGHT: 65 IN | TEMPERATURE: 98 F | BODY MASS INDEX: 31.71 KG/M2 | WEIGHT: 190.31 LBS | RESPIRATION RATE: 18 BRPM | SYSTOLIC BLOOD PRESSURE: 177 MMHG | HEART RATE: 57 BPM | OXYGEN SATURATION: 93 % | DIASTOLIC BLOOD PRESSURE: 71 MMHG

## 2018-05-05 PROCEDURE — 94660 CPAP INITIATION&MGMT: CPT

## 2018-05-05 PROCEDURE — 82962 GLUCOSE BLOOD TEST: CPT

## 2018-05-05 PROCEDURE — 85025 COMPLETE CBC W/AUTO DIFF WBC: CPT | Performed by: INTERNAL MEDICINE

## 2018-05-05 PROCEDURE — 80048 BASIC METABOLIC PNL TOTAL CA: CPT | Performed by: INTERNAL MEDICINE

## 2018-05-05 RX ORDER — INSULIN ASPART 100 [IU]/ML
INJECTION, SOLUTION INTRAVENOUS; SUBCUTANEOUS
Status: SHIPPED | COMMUNITY
Start: 2018-05-05 | End: 2018-05-05

## 2018-05-05 RX ORDER — LISINOPRIL 20 MG/1
20 TABLET ORAL 2 TIMES DAILY
Qty: 60 TABLET | Refills: 0 | Status: SHIPPED | OUTPATIENT
Start: 2018-05-05 | End: 2018-07-07

## 2018-05-05 RX ORDER — AMLODIPINE BESYLATE 5 MG/1
5 TABLET ORAL DAILY
Status: DISCONTINUED | OUTPATIENT
Start: 2018-05-05 | End: 2018-05-05

## 2018-05-05 RX ORDER — AMLODIPINE BESYLATE 5 MG/1
5 TABLET ORAL DAILY
Qty: 30 TABLET | Refills: 0 | Status: SHIPPED | OUTPATIENT
Start: 2018-05-06 | End: 2018-06-22

## 2018-05-05 RX ORDER — INSULIN ASPART 100 [IU]/ML
INJECTION, SOLUTION INTRAVENOUS; SUBCUTANEOUS
Status: SHIPPED | COMMUNITY
Start: 2018-05-05 | End: 2018-09-11

## 2018-05-05 NOTE — PROGRESS NOTES
ENDOCRINOLOGY PROGRESS NOTE    Typed by Luann Dover MD on 5/5/2018      S:  Pt to be discharged back to nursing home today, per nurse that called me.        O:  /90 (BP Location: Right arm)   Pulse 66   Temp 98.1 °F (36.7 °C) (Oral)   Res hospitalized:     Levemir 12 Units at bedtime     Novolog 1 Unit for every 15 grams of carbs with meals     Novolog correction of 1 Unit for every 30 above 140 with meals and bedtime      · Monitor glucoses at mealtimes and bedtime.   · Hypoglycemia [employer]

## 2018-05-05 NOTE — CM/SW NOTE
alphonso notified that pt can discharge today. alphonso notified glenna ramey RN to call report to [phone]. [facility] ambulance arranged for 6pm, PCS form completed.

## 2018-05-05 NOTE — PLAN OF CARE
D: Patient received orders for discharge.     A: Reviewed discharge instructions with patient's daughter, Elsa Ba, including appointments made and to be made, copy given to Elsa Ba; reviewed discharge medications, prescriptions to be picked up; called report to

## 2018-05-05 NOTE — PROGRESS NOTES
[facility] Cardiology Progress Note    Saritha Trotter Patient Status:  Inpatient    11/10/1940 MRN [id number]   [facility] 2NE-A Attending Clementina Mckay MD   Hosp Day # 1 PCP Kain Candelario MD     Subjective: Pt doing better to ml   Output             1200 ml   Net             -560 ml       Last 3 Weights  05/05/18 0533 : 190 lb 4.8 oz (86.3 kg)  05/03/18 2344 : 193 lb 1.6 oz (87.6 kg)  05/03/18 1620 : 200 lb (90.7 kg)  04/10/18 2100 : 189 lb 14.4 oz (86.1 kg)  04/10/18 1639 : 20 in the last 72 hours. No results for input(s): PTP, INR in the last 72 hours. Madhavi Urrutia  5/5/2018  8:27 AM    Pt seen and examined independently. Agree with above. Pt denies focal CV complaints at this time. HR stable.    Norvasc 5 mg da

## 2018-05-05 NOTE — PLAN OF CARE
Problem: CARDIOVASCULAR - ADULT  Goal: Maintains optimal cardiac output and hemodynamic stability  INTERVENTIONS:  - Monitor vital signs, rhythm, and trends  - Monitor for bleeding, hypotension and signs of decreased cardiac output  - Evaluate effectivenes outpatient. 1355: Spoke to MICHELLE Vo, she will round with Dr Caroline Salazar on patient     1500: Spoke to patient's daughter, Lamberto Bain, informed of discharge, will come to review discharge paperwork.      1545: Patient's daughter, Lamberto Bain, present and review

## 2018-05-05 NOTE — PLAN OF CARE
Impaired Functional Mobility    • Achieve highest/safest level of mobility/gait Progressing        Patient/Family Goals    • Patient/Family Long Term Goal Progressing    • Patient/Family Short Term Goal Progressing          Alert to self, confused tonight,

## 2018-05-05 NOTE — PROGRESS NOTES
05/04/18 2054 05/04/18 2124   Vital Signs   BP (!) 191/83 (!) 166/50   BP Location Right arm Right arm   BP Method Automatic Automatic   Patient Position Lying Lying       BP improved after 20 mg lisinopril and prn hydralazine, MD not contacted at this

## 2018-05-07 NOTE — CM/SW NOTE
05/07/18 0926   Discharge disposition   Expected discharge disposition [facility]   Name of Facillity/Home Care/Hospice [address]   Discharge transportation [employer]

## 2018-05-30 PROBLEM — E11.65 TYPE 2 DIABETES, UNCONTROLLED, WITH NEUROPATHY (HCC): Status: ACTIVE | Noted: 2018-05-30

## 2018-05-30 PROBLEM — R73.9 HYPERGLYCEMIA: Status: RESOLVED | Noted: 2018-04-10 | Resolved: 2018-05-30

## 2018-05-30 PROBLEM — Z79.4 UNCONTROLLED TYPE 2 DIABETES MELLITUS WITH HYPERGLYCEMIA, WITH LONG-TERM CURRENT USE OF INSULIN (HCC): Status: ACTIVE | Noted: 2018-05-30

## 2018-05-30 PROBLEM — E11.65 UNCONTROLLED TYPE 2 DIABETES MELLITUS WITH STAGE 3 CHRONIC KIDNEY DISEASE, WITH LONG-TERM CURRENT USE OF INSULIN (HCC): Status: ACTIVE | Noted: 2018-05-30

## 2018-05-30 PROBLEM — Z79.4 LONG-TERM INSULIN USE (HCC): Status: ACTIVE | Noted: 2018-05-30

## 2018-05-30 PROBLEM — N18.30 UNCONTROLLED TYPE 2 DIABETES MELLITUS WITH STAGE 3 CHRONIC KIDNEY DISEASE, WITH LONG-TERM CURRENT USE OF INSULIN (HCC): Status: ACTIVE | Noted: 2018-05-30

## 2018-05-30 PROBLEM — E11.40 TYPE 2 DIABETES, UNCONTROLLED, WITH NEUROPATHY (HCC): Status: ACTIVE | Noted: 2018-05-30

## 2018-05-30 PROBLEM — E11.22 UNCONTROLLED TYPE 2 DIABETES MELLITUS WITH STAGE 3 CHRONIC KIDNEY DISEASE, WITH LONG-TERM CURRENT USE OF INSULIN (HCC): Status: ACTIVE | Noted: 2018-05-30

## 2018-05-30 PROBLEM — Z79.4 UNCONTROLLED TYPE 2 DIABETES MELLITUS WITH STAGE 3 CHRONIC KIDNEY DISEASE, WITH LONG-TERM CURRENT USE OF INSULIN (HCC): Status: ACTIVE | Noted: 2018-05-30

## 2018-05-30 PROBLEM — E11.65 UNCONTROLLED TYPE 2 DIABETES MELLITUS WITH HYPERGLYCEMIA, WITH LONG-TERM CURRENT USE OF INSULIN (HCC): Status: ACTIVE | Noted: 2018-05-30

## 2018-05-30 PROBLEM — Z95.5 S/P CORONARY ARTERY STENT PLACEMENT: Status: ACTIVE | Noted: 2018-05-30

## 2018-06-12 NOTE — DISCHARGE SUMMARY
[facility]  Discharge Summary    Ignacio Melendez Patient Status:  Inpatient    11/10/1940 MRN [id number]   [facility] 2NE-A Attending No att. providers found   Hosp Day # 1 PCP Estephania Solis MD     Date of Admission: 5/3/2018    Date o note        Hospital Course: 65year old female admitted with hypoglycemia, she has advanced dementia and irregular dietary habits. She lives in [place], WC bound.   She is seen by Endocrinologist and adjusted her insulin, follow up with Endocrinology is [place] [employer] Tab  Take 40 mg by mouth 2 (two) times daily. , Historical    Cholecalciferol (VITAMIN D) 2000 UNITS Oral Cap  Take 2,000 Units by mouth every morning.  , Historical    Potassium Chloride ER (K-DUR) 10 MEQ Oral Tab CR  Take 10 mEq by mouth 2 (two) times jing

## 2018-08-24 ENCOUNTER — APPOINTMENT (OUTPATIENT)
Dept: GENERAL RADIOLOGY | Facility: HOSPITAL | Age: 78
End: 2018-08-24
Attending: NURSE PRACTITIONER
Payer: MEDICARE

## 2018-08-24 ENCOUNTER — HOSPITAL ENCOUNTER (EMERGENCY)
Facility: HOSPITAL | Age: 78
Discharge: HOME | End: 2018-08-24
Attending: EMERGENCY MEDICINE
Payer: MEDICARE

## 2018-08-24 ENCOUNTER — APPOINTMENT (OUTPATIENT)
Dept: CT IMAGING | Facility: HOSPITAL | Age: 78
End: 2018-08-24
Attending: NURSE PRACTITIONER
Payer: MEDICARE

## 2018-08-24 VITALS
RESPIRATION RATE: 16 BRPM | BODY MASS INDEX: 31.7 KG/M2 | WEIGHT: 190.25 LBS | SYSTOLIC BLOOD PRESSURE: 148 MMHG | HEIGHT: 65 IN | OXYGEN SATURATION: 100 % | HEART RATE: 64 BPM | TEMPERATURE: 98 F | DIASTOLIC BLOOD PRESSURE: 54 MMHG

## 2018-08-24 DIAGNOSIS — S69.91XA INJURY OF RIGHT HAND, INITIAL ENCOUNTER: ICD-10-CM

## 2018-08-24 DIAGNOSIS — S01.81XA FACIAL LACERATION, INITIAL ENCOUNTER: ICD-10-CM

## 2018-08-24 DIAGNOSIS — N39.0 ACUTE URINARY TRACT INFECTION: ICD-10-CM

## 2018-08-24 DIAGNOSIS — N17.9 AKI (ACUTE KIDNEY INJURY) (HCC): ICD-10-CM

## 2018-08-24 DIAGNOSIS — S09.90XA INJURY OF HEAD, INITIAL ENCOUNTER: ICD-10-CM
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
## 2018-08-24 LAB
ALBUMIN SERPL-MCNC: 3 G/DL (ref 3.5–4.8)
ALBUMIN/GLOB SERPL: 0.7 {RATIO} (ref 1–2)
ALP LIVER SERPL-CCNC: 92 U/L (ref 55–142)
ALT SERPL-CCNC: 15 U/L (ref 14–54)
ANION GAP SERPL CALC-SCNC: 5 MMOL/L (ref 0–18)
AST SERPL-CCNC: 16 U/L (ref 15–41)
BASOPHILS # BLD AUTO: 0.04 X10(3) UL (ref 0–0.1)
BASOPHILS NFR BLD AUTO: 0.4 %
BILIRUB SERPL-MCNC: 0.2 MG/DL (ref 0.1–2)
BILIRUB UR QL STRIP.AUTO: NEGATIVE
BUN BLD-MCNC: 51 MG/DL (ref 8–20)
BUN/CREAT SERPL: 24.8 (ref 10–20)
CALCIUM BLD-MCNC: 8.7 MG/DL (ref 8.3–10.3)
CHLORIDE SERPL-SCNC: 107 MMOL/L (ref 101–111)
CO2 SERPL-SCNC: 28 MMOL/L (ref 22–32)
CREAT BLD-MCNC: 2.06 MG/DL (ref 0.55–1.02)
EOSINOPHIL # BLD AUTO: 0.39 X10(3) UL (ref 0–0.3)
EOSINOPHIL NFR BLD AUTO: 3.8 %
ERYTHROCYTE [DISTWIDTH] IN BLOOD BY AUTOMATED COUNT: 13.3 % (ref 11.5–16)
GLOBULIN PLAS-MCNC: 4.5 G/DL (ref 2.5–4)
GLUCOSE BLD-MCNC: 112 MG/DL (ref 65–99)
GLUCOSE BLD-MCNC: 88 MG/DL (ref 70–99)
GLUCOSE UR STRIP.AUTO-MCNC: NEGATIVE MG/DL
HCT VFR BLD AUTO: 34.3 % (ref 34–50)
HGB BLD-MCNC: 11.4 G/DL (ref 12–16)
IMMATURE GRANULOCYTE COUNT: 0.04 X10(3) UL (ref 0–1)
IMMATURE GRANULOCYTE RATIO %: 0.4 %
KETONES UR STRIP.AUTO-MCNC: NEGATIVE MG/DL
LYMPHOCYTES # BLD AUTO: 1.44 X10(3) UL (ref 0.9–4)
LYMPHOCYTES NFR BLD AUTO: 14.1 %
M PROTEIN MFR SERPL ELPH: 7.5 G/DL (ref 6.1–8.3)
MCH RBC QN AUTO: 29.6 PG (ref 27–33.2)
MCHC RBC AUTO-ENTMCNC: 33.2 G/DL (ref 31–37)
MCV RBC AUTO: 89.1 FL (ref 81–100)
MONOCYTES # BLD AUTO: 0.67 X10(3) UL (ref 0.1–1)
MONOCYTES NFR BLD AUTO: 6.6 %
NEUTROPHIL ABS PRELIM: 7.64 X10 (3) UL (ref 1.3–6.7)
NEUTROPHILS # BLD AUTO: 7.64 X10(3) UL (ref 1.3–6.7)
NEUTROPHILS NFR BLD AUTO: 74.7 %
NITRITE UR QL STRIP.AUTO: NEGATIVE
OSMOLALITY SERPL CALC.SUM OF ELEC: 303 MOSM/KG (ref 275–295)
PH UR STRIP.AUTO: 8 [PH] (ref 4.5–8)
PLATELET # BLD AUTO: 205 10(3)UL (ref 150–450)
POTASSIUM SERPL-SCNC: 4.1 MMOL/L (ref 3.6–5.1)
PROT UR STRIP.AUTO-MCNC: 100 MG/DL
RBC # BLD AUTO: 3.85 X10(6)UL (ref 3.8–5.1)
RBC #/AREA URNS AUTO: >10 /HPF
RED CELL DISTRIBUTION WIDTH-SD: 43 FL (ref 35.1–46.3)
SODIUM SERPL-SCNC: 140 MMOL/L (ref 136–144)
SP GR UR STRIP.AUTO: 1.01 (ref 1–1.03)
UROBILINOGEN UR STRIP.AUTO-MCNC: <2 MG/DL
WBC # BLD AUTO: 10.2 X10(3) UL (ref 4–13)
WBC #/AREA URNS AUTO: >50 /HPF
WBC CLUMPS UR QL AUTO: PRESENT

## 2018-08-24 PROCEDURE — 87086 URINE CULTURE/COLONY COUNT: CPT | Performed by: NURSE PRACTITIONER

## 2018-08-24 PROCEDURE — 99284 EMERGENCY DEPT VISIT MOD MDM: CPT

## 2018-08-24 PROCEDURE — 72125 CT NECK SPINE W/O DYE: CPT | Performed by: NURSE PRACTITIONER

## 2018-08-24 PROCEDURE — 70450 CT HEAD/BRAIN W/O DYE: CPT | Performed by: NURSE PRACTITIONER

## 2018-08-24 PROCEDURE — 82962 GLUCOSE BLOOD TEST: CPT

## 2018-08-24 PROCEDURE — 99285 EMERGENCY DEPT VISIT HI MDM: CPT

## 2018-08-24 PROCEDURE — 80053 COMPREHEN METABOLIC PANEL: CPT | Performed by: NURSE PRACTITIONER

## 2018-08-24 PROCEDURE — 36415 COLL VENOUS BLD VENIPUNCTURE: CPT

## 2018-08-24 PROCEDURE — 85025 COMPLETE CBC W/AUTO DIFF WBC: CPT | Performed by: NURSE PRACTITIONER

## 2018-08-24 PROCEDURE — 12011 RPR F/E/E/N/L/M 2.5 CM/<: CPT

## 2018-08-24 PROCEDURE — 73130 X-RAY EXAM OF HAND: CPT | Performed by: NURSE PRACTITIONER

## 2018-08-24 PROCEDURE — 81001 URINALYSIS AUTO W/SCOPE: CPT | Performed by: NURSE PRACTITIONER

## 2018-08-24 RX ORDER — NITROFURANTOIN 25; 75 MG/1; MG/1
100 CAPSULE ORAL 2 TIMES DAILY
Qty: 20 CAPSULE | Refills: 0 | Status: ON HOLD | OUTPATIENT
Start: 2018-08-24 | End: 2018-08-30

## 2018-08-24 NOTE — ED PROVIDER NOTES
Patient Seen in: [facility] Emergency Department    History   Patient presents with:  Trauma (cardiovascular, musculoskeletal)    Stated Complaint: Fall    65year-old female presents today with history of fall.   Patient is wheelchair bound who stay ANGIOPLASTY (CORONARY)      Comment: 100% diagonal, unable to wire  No date: APPENDECTOMY  No date: BACK SURGERY  No date: CATH DRUG ELUTING STENT  No date: CHOLECYSTECTOMY  No date: HYSTERECTOMY  No date: KNEE REPLACEMENT SURGERY Right  No date: OTHER EMILY joint.  Normal flexion and extension of the fingers and hand. No pain with palpation or flexion of the hip, knee or ankles bilaterally. Pulses are palpable capillary refill is 3 seconds. Neurological: She is alert. Skin: Skin is warm and dry.    Nursi URINE CULTURE, ROUTINE       ED Course as of Aug 24 1922  ------------------------------------------------------------  Xr Hand (min 3 Views), Right (cpt=73130)    Result Date: 8/24/2018  PROCEDURE:  XR HAND (MIN 3 VIEWS), RIGHT (CPT=73130)  TECHNIQUE: Cervical (cpt=72125)    Result Date: 8/24/2018  PROCEDURE:  CT SPINE CERVICAL (CPT=72125)  COMPARISON:  [facility] , CT BRAIN OR HEAD (25172), 8/24/2018, 18:05[facility] , CT SPINE CERVICAL (CPT=72125), 6/20/2017, 0:48.   INDICATIONS:  NE  TECHNIQUE:  Noncontrast C on 8/24/2018 at 18:44     Approved by: Virginia Mejia MD            MDM     Patient presents today with history of fall from her bed which she struck her head and injured her right hand. X-rays and CT scan showed no acute findings.   Patient did have a by mouth 2 (two) times daily.   Qty: 20 capsule Refills: 0

## 2018-08-24 NOTE — ED NOTES
Blood sugar is 88 now.  Daughter says once BS gets into the low 100s it drops really fast. Apple juice given to patient per MD.

## 2018-08-27 ENCOUNTER — APPOINTMENT (OUTPATIENT)
Dept: GENERAL RADIOLOGY | Facility: HOSPITAL | Age: 78
DRG: 641 | End: 2018-08-27
Attending: EMERGENCY MEDICINE
Payer: MEDICARE

## 2018-08-27 ENCOUNTER — HOSPITAL ENCOUNTER (INPATIENT)
Facility: HOSPITAL | Age: 78
LOS: 3 days | Discharge: NURSING, REHAB OR LONG-TERM CARE | DRG: 641 | End: 2018-08-30
Attending: EMERGENCY MEDICINE | Admitting: HOSPITALIST
Payer: MEDICARE

## 2018-08-27 ENCOUNTER — APPOINTMENT (OUTPATIENT)
Dept: CT IMAGING | Facility: HOSPITAL | Age: 78
DRG: 641 | End: 2018-08-27
Attending: EMERGENCY MEDICINE
Payer: MEDICARE

## 2018-08-27 DIAGNOSIS — R00.1 BRADYCARDIA: ICD-10-CM

## 2018-08-27 DIAGNOSIS — A41.9 SEPSIS, DUE TO UNSPECIFIED ORGANISM: Primary | ICD-10-CM

## 2018-08-27 DIAGNOSIS — I95.9 HYPOTENSION, UNSPECIFIED HYPOTENSION TYPE: ICD-10-CM

## 2018-08-27 DIAGNOSIS — R19.7 DIARRHEA, UNSPECIFIED TYPE: ICD-10-CM

## 2018-08-27 PROBLEM — E87.2 METABOLIC ACIDOSIS: Status: ACTIVE | Noted: 2018-08-27

## 2018-08-27 PROBLEM — N17.9 ACUTE KIDNEY INJURY (HCC): Status: ACTIVE | Noted: 2018-08-27

## 2018-08-27 PROBLEM — R73.9 HYPERGLYCEMIA: Status: ACTIVE | Noted: 2018-08-27

## 2018-08-27 LAB
ALBUMIN SERPL-MCNC: 2.8 G/DL (ref 3.5–4.8)
ALBUMIN/GLOB SERPL: 0.6 {RATIO} (ref 1–2)
ALP LIVER SERPL-CCNC: 93 U/L (ref 55–142)
ALT SERPL-CCNC: 12 U/L (ref 14–54)
ANION GAP SERPL CALC-SCNC: 12 MMOL/L (ref 0–18)
APTT PPP: 29 SECONDS (ref 26.1–34.6)
AST SERPL-CCNC: 12 U/L (ref 15–41)
ATRIAL RATE: 44 BPM
BASOPHILS # BLD AUTO: 0.06 X10(3) UL (ref 0–0.1)
BASOPHILS NFR BLD AUTO: 0.4 %
BILIRUB SERPL-MCNC: 0.3 MG/DL (ref 0.1–2)
BUN BLD-MCNC: 49 MG/DL (ref 8–20)
BUN/CREAT SERPL: 21.8 (ref 10–20)
CALCIUM BLD-MCNC: 8.9 MG/DL (ref 8.3–10.3)
CHLORIDE SERPL-SCNC: 113 MMOL/L (ref 101–111)
CO2 SERPL-SCNC: 19 MMOL/L (ref 22–32)
CREAT BLD-MCNC: 2.25 MG/DL (ref 0.55–1.02)
EOSINOPHIL # BLD AUTO: 0.24 X10(3) UL (ref 0–0.3)
EOSINOPHIL NFR BLD AUTO: 1.8 %
ERYTHROCYTE [DISTWIDTH] IN BLOOD BY AUTOMATED COUNT: 13.4 % (ref 11.5–16)
GLOBULIN PLAS-MCNC: 4.4 G/DL (ref 2.5–4)
GLUCOSE BLD-MCNC: 188 MG/DL (ref 70–99)
GLUCOSE BLD-MCNC: 273 MG/DL (ref 65–99)
GLUCOSE BLD-MCNC: 379 MG/DL (ref 65–99)
HAV IGM SER QL: 2.4 MG/DL (ref 1.8–2.5)
HCT VFR BLD AUTO: 38.2 % (ref 34–50)
HGB BLD-MCNC: 12.1 G/DL (ref 12–16)
IMMATURE GRANULOCYTE COUNT: 0.09 X10(3) UL (ref 0–1)
IMMATURE GRANULOCYTE RATIO %: 0.7 %
INR BLD: 1.21 (ref 0.9–1.1)
LACTIC ACID: 1.9 MMOL/L (ref 0.5–2)
LACTIC ACID: 3.2 MMOL/L (ref 0.5–2)
LYMPHOCYTES # BLD AUTO: 1.54 X10(3) UL (ref 0.9–4)
LYMPHOCYTES NFR BLD AUTO: 11.2 %
M PROTEIN MFR SERPL ELPH: 7.2 G/DL (ref 6.1–8.3)
MCH RBC QN AUTO: 29.2 PG (ref 27–33.2)
MCHC RBC AUTO-ENTMCNC: 31.7 G/DL (ref 31–37)
MCV RBC AUTO: 92.3 FL (ref 81–100)
MONOCYTES # BLD AUTO: 0.91 X10(3) UL (ref 0.1–1)
MONOCYTES NFR BLD AUTO: 6.6 %
NEUTROPHIL ABS PRELIM: 10.86 X10 (3) UL (ref 1.3–6.7)
NEUTROPHILS # BLD AUTO: 10.86 X10(3) UL (ref 1.3–6.7)
NEUTROPHILS NFR BLD AUTO: 79.3 %
OSMOLALITY SERPL CALC.SUM OF ELEC: 316 MOSM/KG (ref 275–295)
P AXIS: 14 DEGREES
P-R INTERVAL: 244 MS
PLATELET # BLD AUTO: 225 10(3)UL (ref 150–450)
POTASSIUM SERPL-SCNC: 4 MMOL/L (ref 3.6–5.1)
PSA SERPL DL<=0.01 NG/ML-MCNC: 15.8 SECONDS (ref 12.4–14.7)
Q-T INTERVAL: 514 MS
QRS DURATION: 94 MS
QTC CALCULATION (BEZET): 439 MS
R AXIS: -18 DEGREES
RBC # BLD AUTO: 4.14 X10(6)UL (ref 3.8–5.1)
RED CELL DISTRIBUTION WIDTH-SD: 44.9 FL (ref 35.1–46.3)
SODIUM SERPL-SCNC: 144 MMOL/L (ref 136–144)
T AXIS: 136 DEGREES
T4 FREE SERPL-MCNC: 0.7 NG/DL (ref 0.9–1.8)
TROPONIN I SERPL-MCNC: <0.046 NG/ML (ref ?–0.05)
TSI SER-ACNC: 7.97 MIU/ML (ref 0.35–5.5)
VENTRICULAR RATE: 44 BPM
WBC # BLD AUTO: 13.7 X10(3) UL (ref 4–13)

## 2018-08-27 PROCEDURE — 71045 X-RAY EXAM CHEST 1 VIEW: CPT | Performed by: EMERGENCY MEDICINE

## 2018-08-27 PROCEDURE — 70450 CT HEAD/BRAIN W/O DYE: CPT | Performed by: EMERGENCY MEDICINE

## 2018-08-27 PROCEDURE — 99223 1ST HOSP IP/OBS HIGH 75: CPT | Performed by: HOSPITALIST

## 2018-08-27 RX ORDER — ESCITALOPRAM OXALATE 10 MG/1
10 TABLET ORAL EVERY MORNING
Status: DISCONTINUED | OUTPATIENT
Start: 2018-08-27 | End: 2018-08-27

## 2018-08-27 RX ORDER — CALCIUM GLUCONATE 94 MG/ML
1 INJECTION, SOLUTION INTRAVENOUS ONCE
Status: DISCONTINUED | OUTPATIENT
Start: 2018-08-27 | End: 2018-08-27

## 2018-08-27 RX ORDER — HEPARIN SODIUM 5000 [USP'U]/ML
5000 INJECTION, SOLUTION INTRAVENOUS; SUBCUTANEOUS EVERY 8 HOURS SCHEDULED
Status: DISCONTINUED | OUTPATIENT
Start: 2018-08-27 | End: 2018-08-30

## 2018-08-27 RX ORDER — QUETIAPINE 25 MG/1
50 TABLET, FILM COATED ORAL NIGHTLY
Status: DISCONTINUED | OUTPATIENT
Start: 2018-08-27 | End: 2018-08-27

## 2018-08-27 RX ORDER — ONDANSETRON 2 MG/ML
4 INJECTION INTRAMUSCULAR; INTRAVENOUS EVERY 6 HOURS PRN
Status: DISCONTINUED | OUTPATIENT
Start: 2018-08-27 | End: 2018-08-30

## 2018-08-27 RX ORDER — CALCIUM CHLORIDE 100 MG/ML
INJECTION INTRAVENOUS; INTRAVENTRICULAR
Status: DISCONTINUED
Start: 2018-08-27 | End: 2018-08-27 | Stop reason: WASHOUT

## 2018-08-27 RX ORDER — ASPIRIN 325 MG
325 TABLET ORAL DAILY
Status: DISCONTINUED | OUTPATIENT
Start: 2018-08-28 | End: 2018-08-30

## 2018-08-27 RX ORDER — ASPIRIN 325 MG
325 TABLET ORAL NIGHTLY
Status: DISCONTINUED | OUTPATIENT
Start: 2018-08-27 | End: 2018-08-27 | Stop reason: SDUPTHER

## 2018-08-27 RX ORDER — TRAZODONE HYDROCHLORIDE 50 MG/1
50 TABLET ORAL NIGHTLY
Status: DISCONTINUED | OUTPATIENT
Start: 2018-08-27 | End: 2018-08-27

## 2018-08-27 RX ORDER — POTASSIUM CHLORIDE 750 MG/1
10 TABLET, EXTENDED RELEASE ORAL 2 TIMES DAILY
Status: ON HOLD | COMMUNITY
End: 2019-01-01

## 2018-08-27 RX ORDER — METOCLOPRAMIDE HYDROCHLORIDE 5 MG/ML
5 INJECTION INTRAMUSCULAR; INTRAVENOUS EVERY 8 HOURS PRN
Status: DISCONTINUED | OUTPATIENT
Start: 2018-08-27 | End: 2018-08-30

## 2018-08-27 RX ORDER — HYDRALAZINE HYDROCHLORIDE 20 MG/ML
10 INJECTION INTRAMUSCULAR; INTRAVENOUS EVERY 4 HOURS PRN
Status: DISCONTINUED | OUTPATIENT
Start: 2018-08-27 | End: 2018-08-30

## 2018-08-27 RX ORDER — SODIUM CHLORIDE 9 MG/ML
INJECTION, SOLUTION INTRAVENOUS CONTINUOUS
Status: DISCONTINUED | OUTPATIENT
Start: 2018-08-27 | End: 2018-08-29

## 2018-08-27 RX ORDER — ACETAMINOPHEN 325 MG/1
650 TABLET ORAL EVERY 6 HOURS PRN
Status: DISCONTINUED | OUTPATIENT
Start: 2018-08-27 | End: 2018-08-30

## 2018-08-27 RX ORDER — DONEPEZIL HYDROCHLORIDE 10 MG/1
20 TABLET, FILM COATED ORAL NIGHTLY
Status: DISCONTINUED | OUTPATIENT
Start: 2018-08-27 | End: 2018-08-27

## 2018-08-27 RX ORDER — ATORVASTATIN CALCIUM 10 MG/1
10 TABLET, FILM COATED ORAL NIGHTLY
Status: DISCONTINUED | OUTPATIENT
Start: 2018-08-27 | End: 2018-08-30

## 2018-08-27 RX ORDER — QUETIAPINE 25 MG/1
25 TABLET, FILM COATED ORAL NIGHTLY
Status: DISCONTINUED | OUTPATIENT
Start: 2018-08-27 | End: 2018-08-30

## 2018-08-27 RX ORDER — IBUPROFEN 200 MG
1 CAPSULE ORAL DAILY PRN
Status: ON HOLD | COMMUNITY
End: 2019-01-01

## 2018-08-27 RX ORDER — ALBUTEROL SULFATE 90 UG/1
2 AEROSOL, METERED RESPIRATORY (INHALATION) EVERY 6 HOURS PRN
COMMUNITY

## 2018-08-27 RX ORDER — POTASSIUM CHLORIDE 20 MEQ/1
20 TABLET, EXTENDED RELEASE ORAL 2 TIMES DAILY
Status: ON HOLD | COMMUNITY
End: 2018-08-27

## 2018-08-27 RX ORDER — LORATADINE 10 MG/1
10 TABLET ORAL DAILY
Status: ON HOLD | COMMUNITY
End: 2019-01-01

## 2018-08-27 RX ORDER — PANTOPRAZOLE SODIUM 20 MG/1
20 TABLET, DELAYED RELEASE ORAL
Status: DISCONTINUED | OUTPATIENT
Start: 2018-08-28 | End: 2018-08-30

## 2018-08-27 RX ORDER — DEXTROSE MONOHYDRATE 25 G/50ML
50 INJECTION, SOLUTION INTRAVENOUS
Status: DISCONTINUED | OUTPATIENT
Start: 2018-08-27 | End: 2018-08-30

## 2018-08-27 RX ORDER — PEPPERMINT OIL
10 OIL (ML) MISCELLANEOUS AS NEEDED
Status: DISCONTINUED | OUTPATIENT
Start: 2018-08-27 | End: 2018-08-30

## 2018-08-27 RX ORDER — DOPAMINE HYDROCHLORIDE 320 MG/100ML
INJECTION, SOLUTION INTRAVENOUS CONTINUOUS
Status: DISCONTINUED | OUTPATIENT
Start: 2018-08-27 | End: 2018-08-28

## 2018-08-27 RX ORDER — ESOMEPRAZOLE MAGNESIUM 40 MG/1
40 CAPSULE, DELAYED RELEASE ORAL
COMMUNITY

## 2018-08-27 RX ORDER — CYANOCOBALAMIN 1000 UG/ML
1000 INJECTION INTRAMUSCULAR; SUBCUTANEOUS
COMMUNITY
End: 2018-09-22

## 2018-08-27 RX ORDER — QUETIAPINE 25 MG/1
25 TABLET, FILM COATED ORAL NIGHTLY
Status: ON HOLD | COMMUNITY
End: 2019-01-01

## 2018-08-27 RX ORDER — DONEPEZIL HYDROCHLORIDE 10 MG/1
10 TABLET, FILM COATED ORAL NIGHTLY
Status: DISCONTINUED | OUTPATIENT
Start: 2018-08-27 | End: 2018-08-30

## 2018-08-27 RX ORDER — LISINOPRIL 20 MG/1
20 TABLET ORAL 2 TIMES DAILY
Status: ON HOLD | COMMUNITY
End: 2018-08-30

## 2018-08-27 NOTE — PROGRESS NOTES
[facility] Pharmacy Note:  Renal Dose Adjustment for Metoclopramide (REGLAN)    Charli Cadena has been prescribed Metoclopramide (REGLAN) 10 mg every 8 hours as needed for nausea/vomiting.     Estimated Creatinine Clearance: 18.5 mL/min (A) (based on SCr of 2.25

## 2018-08-27 NOTE — H&P
[facility]  History and Physical     Clare Fleming Patient Status:  Emergency    11/10/1940 MRN [id number]   Location [address] [facility] Attending Lee Lucas MD   Hosp Day # 0 PCP Nichola Boast, MD     Chief Complain CHOLECYSTECTOMY  No date: HYSTERECTOMY  No date: KNEE REPLACEMENT SURGERY Right  No date: OTHER SURGICAL HISTORY  No date: REPAIR ROTATOR CUFF,ACUTE      Comment: right shoulder  No date: TOTAL ABDOM HYSTERECTOMY  No date: TOTAL KNEE REPLACEMENT    Social UNIT/ML Subcutaneous Solution Take per sliding scale as directed with meals and bedtime - max 30 Units per day Disp:  Rfl:    simvastatin 20 MG Oral Tab Take 20 mg by mouth nightly.  Disp:  Rfl:    Citalopram Hydrobromide 20 MG Oral Tab Take 20 mg by mouth guarding or organomegaly. Neurologic: No focal neurological deficits. CNII-XII grossly intact. Musculoskeletal: Moves all extremities. Extremities: No edema or cyanosis. Integument: No rashes or lesions. Psychiatric: Appropriate mood and affect.

## 2018-08-27 NOTE — ED NOTES
Pt cleaned up, yellow malodorous stool saturated in diaper. New diaper placed, pt incontinent of more stool. MD aware. Calling central sterile for rectal tube.

## 2018-08-27 NOTE — ED PROVIDER NOTES
Patient Seen in: [facility] Emergency Department    History   Patient presents with:  Altered Mental Status (neurologic)    Stated Complaint: ams    HPI    Patient presents with altered mental status.   Per the report from the medics and the daughter mooney catheter since 2011       Past Surgical History:  No date: ANGIOGRAM  10/24/07: ANGIOPLASTY (CORONARY)      Comment: 90% LADp (3.0x16mm Taxus), 90% D1 (2.5x12mm                Vision)  10/29/07: ANGIOPLASTY (CORONARY)      Comment: 100% diagonal, Pat Ch extremities to command. Sensation grossly intact.     ED Course     Labs Reviewed   COMP METABOLIC PANEL (14) - Abnormal; Notable for the following:        Result Value    Glucose 188 (*)     Chloride 113 (*)     CO2 19.0 (*)     BUN 49 (*)     Creatinine W/MICHAEL.   Procedure                               Abnormality         Status                     ---------                               -----------         ------                     STOOL CULTURE(P)[[id number]]                                 In proc meropenem (MERREM) 1 g in sodium chloride 0.9% 100 mL MBP (1 g Intravenous New Bag 8/27/18 1433)   Vancomycin HCl (FIRVANQ) 50 MG/ML oral solution 125 mg (125 mg Oral Given 8/27/18 1433)     The patient had an IV placed and was started on IV fluid resusc specified.       Medications Prescribed:  Current Discharge Medication List        Present on Admission  Date Reviewed: 5/30/2018          ICD-10-CM Noted POA    Acute kidney injury ([facility] [address] N17.9 8/27/2018 Yes    Hyperglycemia R73.9 3/44/7608 Yes    Metabolic

## 2018-08-27 NOTE — CONSULTS
Pulmonary H&P/Consult       NAME: Lida Nicholas - ROOM: / - MRN: [id number] - Age: 65year old - :  11/10/1940    Date of Admission: 2018 12:04 PM  Admission Diagnosis: No admission diagnoses are documented for this encounter.   Reason for cons wire  No date: APPENDECTOMY  No date: BACK SURGERY  No date: CATH DRUG ELUTING STENT  No date: CHOLECYSTECTOMY  No date: HYSTERECTOMY  No date: KNEE REPLACEMENT SURGERY Right  No date: OTHER SURGICAL HISTORY  No date: [address] Waters denies abdominal pain  :  denies dysuria or changes in stream   MUSCULOSKELETAL:  denies back pain   NEURO:  denies headaches, no strokes or seizure history   PSYCHE:  denies depression or anxiety   HEMATOLOGIC:  denies hx of anemia   ENDOCRINE:  denies and reflexes       throughout           Recent Labs   08/24/18  1647 08/27/18  1231   WBC 10.2 13.7*   HGB 11.4* 12.1   MCV 89.1 92.3   .0 225.0   INR  --  1.21*         Recent Labs   08/24/18  1647 08/27/18  1231    144   K 4.1 4.0    1

## 2018-08-27 NOTE — PROGRESS NOTES
[facility] Pharmacy Note:  Renal Adjustment for meropenem (MERREM)    Sue Lewis is a 65year old female who has been prescribed meropenem (MERREM) 500 mg every 8 hrs.   CrCl is estimated creatinine clearance is 18.5 mL/min (A) (based on SCr of 2.25 mg/dL (H)

## 2018-08-28 ENCOUNTER — APPOINTMENT (OUTPATIENT)
Dept: CV DIAGNOSTICS | Facility: HOSPITAL | Age: 78
DRG: 641 | End: 2018-08-28
Attending: INTERNAL MEDICINE
Payer: MEDICARE

## 2018-08-28 LAB
ALBUMIN SERPL-MCNC: 2.3 G/DL (ref 3.5–4.8)
ALBUMIN/GLOB SERPL: 0.7 {RATIO} (ref 1–2)
ALP LIVER SERPL-CCNC: 72 U/L (ref 55–142)
ALT SERPL-CCNC: 12 U/L (ref 14–54)
ANION GAP SERPL CALC-SCNC: 9 MMOL/L (ref 0–18)
AST SERPL-CCNC: 12 U/L (ref 15–41)
BASOPHILS # BLD AUTO: 0.02 X10(3) UL (ref 0–0.1)
BASOPHILS NFR BLD AUTO: 0.2 %
BILIRUB SERPL-MCNC: 0.3 MG/DL (ref 0.1–2)
BILIRUB UR QL STRIP.AUTO: NEGATIVE
BILIRUB UR QL STRIP.AUTO: NEGATIVE
BUN BLD-MCNC: 49 MG/DL (ref 8–20)
BUN/CREAT SERPL: 26.6 (ref 10–20)
CALCIUM BLD-MCNC: 8.2 MG/DL (ref 8.3–10.3)
CHLORIDE SERPL-SCNC: 115 MMOL/L (ref 101–111)
CO2 SERPL-SCNC: 23 MMOL/L (ref 22–32)
COLOR UR AUTO: YELLOW
COLOR UR AUTO: YELLOW
CREAT BLD-MCNC: 1.84 MG/DL (ref 0.55–1.02)
EOSINOPHIL # BLD AUTO: 0.06 X10(3) UL (ref 0–0.3)
EOSINOPHIL NFR BLD AUTO: 0.5 %
ERYTHROCYTE [DISTWIDTH] IN BLOOD BY AUTOMATED COUNT: 13.3 % (ref 11.5–16)
GLOBULIN PLAS-MCNC: 3.5 G/DL (ref 2.5–4)
GLUCOSE BLD-MCNC: 175 MG/DL (ref 65–99)
GLUCOSE BLD-MCNC: 190 MG/DL (ref 65–99)
GLUCOSE BLD-MCNC: 200 MG/DL (ref 70–99)
GLUCOSE BLD-MCNC: 225 MG/DL (ref 65–99)
GLUCOSE UR STRIP.AUTO-MCNC: 150 MG/DL
GLUCOSE UR STRIP.AUTO-MCNC: 50 MG/DL
HAV IGM SER QL: 2.1 MG/DL (ref 1.8–2.5)
HCT VFR BLD AUTO: 28.6 % (ref 34–50)
HGB BLD-MCNC: 9.3 G/DL (ref 12–16)
HYALINE CASTS #/AREA URNS AUTO: PRESENT /LPF
HYALINE CASTS #/AREA URNS AUTO: PRESENT /LPF
IMMATURE GRANULOCYTE COUNT: 0.05 X10(3) UL (ref 0–1)
IMMATURE GRANULOCYTE RATIO %: 0.4 %
KETONES UR STRIP.AUTO-MCNC: NEGATIVE MG/DL
KETONES UR STRIP.AUTO-MCNC: NEGATIVE MG/DL
LYMPHOCYTES # BLD AUTO: 1.47 X10(3) UL (ref 0.9–4)
LYMPHOCYTES NFR BLD AUTO: 12 %
M PROTEIN MFR SERPL ELPH: 5.8 G/DL (ref 6.1–8.3)
MCH RBC QN AUTO: 29.5 PG (ref 27–33.2)
MCHC RBC AUTO-ENTMCNC: 32.5 G/DL (ref 31–37)
MCV RBC AUTO: 90.8 FL (ref 81–100)
MONOCYTES # BLD AUTO: 0.66 X10(3) UL (ref 0.1–1)
MONOCYTES NFR BLD AUTO: 5.4 %
NEUTROPHIL ABS PRELIM: 10 X10 (3) UL (ref 1.3–6.7)
NEUTROPHILS # BLD AUTO: 10 X10(3) UL (ref 1.3–6.7)
NEUTROPHILS NFR BLD AUTO: 81.5 %
NITRITE UR QL STRIP.AUTO: NEGATIVE
NITRITE UR QL STRIP.AUTO: NEGATIVE
OSMOLALITY SERPL CALC.SUM OF ELEC: 323 MOSM/KG (ref 275–295)
PH UR STRIP.AUTO: 5 [PH] (ref 4.5–8)
PH UR STRIP.AUTO: 5 [PH] (ref 4.5–8)
PLATELET # BLD AUTO: 174 10(3)UL (ref 150–450)
POTASSIUM SERPL-SCNC: 3.9 MMOL/L (ref 3.6–5.1)
PROT UR STRIP.AUTO-MCNC: 100 MG/DL
PROT UR STRIP.AUTO-MCNC: 100 MG/DL
RBC # BLD AUTO: 3.15 X10(6)UL (ref 3.8–5.1)
RBC UR QL AUTO: NEGATIVE
RBC UR QL AUTO: NEGATIVE
RED CELL DISTRIBUTION WIDTH-SD: 43.8 FL (ref 35.1–46.3)
SODIUM SERPL-SCNC: 147 MMOL/L (ref 136–144)
SP GR UR STRIP.AUTO: 1.01 (ref 1–1.03)
SP GR UR STRIP.AUTO: 1.01 (ref 1–1.03)
UROBILINOGEN UR STRIP.AUTO-MCNC: <2 MG/DL
UROBILINOGEN UR STRIP.AUTO-MCNC: <2 MG/DL
WBC # BLD AUTO: 12.3 X10(3) UL (ref 4–13)

## 2018-08-28 PROCEDURE — 93306 TTE W/DOPPLER COMPLETE: CPT | Performed by: INTERNAL MEDICINE

## 2018-08-28 PROCEDURE — 99232 SBSQ HOSP IP/OBS MODERATE 35: CPT | Performed by: HOSPITALIST

## 2018-08-28 RX ORDER — LOPERAMIDE HYDROCHLORIDE 2 MG/1
2 CAPSULE ORAL 4 TIMES DAILY PRN
Status: DISCONTINUED | OUTPATIENT
Start: 2018-08-28 | End: 2018-08-30

## 2018-08-28 NOTE — PLAN OF CARE
Diabetes/Glucose Control    • Glucose maintained within prescribed range Not Progressing        GASTROINTESTINAL - ADULT    • Maintains or returns to baseline bowel function Not Progressing          CARDIOVASCULAR - ADULT    • Maintains optimal cardiac out

## 2018-08-28 NOTE — PHYSICAL THERAPY NOTE
PHYSICAL THERAPY EVALUATION - INPATIENT     Room Number: 469/469-A  Evaluation Date: 8/28/2018  Type of Evaluation: Initial  Physician Order: PT Eval and Treat    Presenting Problem: Possible sepsis due to profound diarrhea; resolved with IVF, no press Surgical History  Past Surgical History:  No date: ANGIOGRAM  10/24/07: ANGIOPLASTY (CORONARY)      Comment: 90% LADp (3.0x16mm Taxus), 90% D1 (2.5x12mm                Vision)  10/29/07: ANGIOPLASTY (CORONARY)      Comment: 100% diagonal, unable to wire  N >92%  Room air  No shortness of breath  Heart Rate: 55 at rest, increase to 68 with activity. Blood Pressure: 168/70 in sitting. AM-PAC '6-Clicks' INPATIENT SHORT FORM - BASIC MOBILITY  How much difficulty does the patient currently have. ..  -   Turn the bed. Recommend use of sit<>stand lift for any t/f's OOB. Exercise/Education Provided:    Educ: activity recommendations, role of inpt PT, DC recommendation, assessment findings, goals and expectations.       Functional activity tolerated  Transfer minimum assistance     Goal #2 Patient is able to demonstrate transfers Sit to/from Stand at assistance level: maximum assistance of 1.      Goal #3      Goal #4    Goal #5    Goal #6    Goal Comments: Goals established on 8/28/2018

## 2018-08-28 NOTE — PROGRESS NOTES
[facility] Patient Status:  Inpatient    11/10/1940 MRN [id number]   [facility] 4SW-A Attending Josey Gutierrez MD   [facility] Day # 1 PCP Hollis Roldan MD     Critical Care Progress Note     Date of Admission: 2018 mg, 10 mg, Intravenous, Q4H PRN  •  glucose (DEX4) oral liquid 15 g, 15 g, Oral, Q15 Min PRN **OR** Glucose-Vitamin C (DEX-4) 4-0.006 g chewable tab 4 tablet, 4 tablet, Oral, Q15 Min PRN **OR** dextrose 50 % injection 50 mL, 50 mL, Intravenous, Q15 Min PRN 144  147*   K  4.1  4.0  3.9   CL  107  113*  115*   CO2  28.0  19.0*  23.0   ALKPHO  92  93  72   AST  16  12*  12*   ALT  15  12*  12*   BILT  0.2  0.3  0.3   TP  7.5  7.2  5.8*     Recent Labs   Lab  08/24/18   1647  08/27/18   1231  08/28/18   0438

## 2018-08-28 NOTE — PROGRESS NOTES
[facility]  Progress Note     Ignacio Melendez Patient Status:  Inpatient    11/10/1940 MRN [id number]   [facility] 4SW-A Attending Aliyah Oreilly MD   Hosp Day # 1 PCP Estephania Solis MD     Chief Complaint: unresponsive     S: Jose Murders Sodium  20 mg Oral QAM AC   • atorvastatin  10 mg Oral Nightly   • Heparin Sodium (Porcine)  5,000 Units Subcutaneous Q8H [address]   • meropenem  500 mg Intravenous Q12H   • aspirin  325 mg Oral Daily   • Donepezil HCl  10 mg Oral Nightly   • QUEtiapine Fumarat

## 2018-08-28 NOTE — PROGRESS NOTES
Received pt from ICU Rm 469. Pt oriented to room. Call light within reach. Bed in low position. Needs attended. Will continue to monitor.       Addendum   @ 1450, PRN hydralazine given, repeat /80 @ 1845

## 2018-08-28 NOTE — CONSULTS
[facility]  Report of Consultation    Neris Burroughs Patient Status:  Inpatient    11/10/1940 MRN [id number]   [facility] 4SW-A Attending Allen Echols MD   Hosp Day # 1 PCP Izabella Ward MD     Reason for Consultation:  Era Franklin details are per the chart as pt does not recall details d/t dementia. Daughter Raya Hernandez arrived to find mother slumped over in chair. Medics called and she was pulseless. They placed her on the monitor and found a rhythm.   Since she is DNR, no CPR started date: ANGIOGRAM  10/24/07: ANGIOPLASTY (CORONARY)      Comment: 90% LADp (3.0x16mm Taxus), 90% D1 (2.5x12mm                Vision)  10/29/07: ANGIOPLASTY (CORONARY)      Comment: 100% diagonal, unable to wire  No date: APPENDECTOMY  No date: BACK SURGERY (LEVOPHED) 4 mg/250 ml premix infusion, 0.5-30 mcg/min, Intravenous, Continuous PRN  •  vasopressin (PITRESSIN) 20 Units in sodium chloride 0.9 % 50 mL infusion for shock, 0.04 Units/min, Intravenous, Continuous PRN  •  Donepezil HCl (ARICEPT) tab 10 mg, 1 zita, HR 44, NS T waves    CT head: no acute process  CXR: normal    Labs:     Recent Labs   08/27/18  1231 08/28/18  0435   WBC 13.7* 12.3   HGB 12.1 9.3*   MCV 92.3 90.8   .0 174.0   INR 1.21*  --        Recent Labs   08/27/18  1231 08/28/18  043

## 2018-08-29 LAB
GLUCOSE BLD-MCNC: 108 MG/DL (ref 65–99)
GLUCOSE BLD-MCNC: 117 MG/DL (ref 65–99)
GLUCOSE BLD-MCNC: 274 MG/DL (ref 65–99)
GLUCOSE BLD-MCNC: 284 MG/DL (ref 65–99)

## 2018-08-29 PROCEDURE — 99232 SBSQ HOSP IP/OBS MODERATE 35: CPT | Performed by: HOSPITALIST

## 2018-08-29 NOTE — PHYSICAL THERAPY NOTE
PHYSICAL THERAPY TREATMENT NOTE - INPATIENT    Room Number: 9256/1299-U     Session: 1   Number of Visits to Meet Established Goals: 6    Presenting Problem: Possible sepsis due to profound diarrhea; resolved with IVF, no pressors    History related to cu Taxus), 90% D1 (2.5x12mm                Vision)  10/29/07: ANGIOPLASTY (CORONARY)      Comment: 100% diagonal, unable to wire  No date: APPENDECTOMY  No date: BACK SURGERY  No date: CATH DRUG ELUTING STENT  No date: CHOLECYSTECTOMY  No date: HYSTERECTOMY Assessment   Gait Assistance: Not tested  Distance (ft): 0  Assistive Device: Rolling walker     Stoop/Curb Assistance: Not tested       Skilled Therapy Provided: Trained pt for supine to sit with Mod Ax2 and sit to supine with Mod Ax1 with VC for proper s assistance      Goal #2 Patient is able to demonstrate transfers Sit to/from Stand at assistance level: maximum assistance of 1.       Goal #3        Goal #4     Goal #5     Goal #6     Goal Comments: Goals established on 8/28/2018, goal in progress on 8/29

## 2018-08-29 NOTE — PLAN OF CARE
Assumed pt care at [address] [place] [address] for the night shift. Pt resting in bed in no apparent distress. VSS. Afebrile. NSR on tele w/1st deegree AVB. Denies any pain,lightheadness or dizziness. IV abx and IVF cont as ordered. Urbina in place draning cloudy yellow urine. Flexis

## 2018-08-29 NOTE — PLAN OF CARE
Assumed care @ 0700. Pt a/o x1-2 to person and place. Disoriented to time and situations. VSS. Tele SR. No acute respiratory distress noted. Denies any pain. Pt stood up with PT, rested in bed.   BM watery stool out put 50 mL noted, rectal tube remov

## 2018-08-29 NOTE — PROGRESS NOTES
[facility]  Cardiology Progress Note    Harris Yañez Patient Status:  Inpatient    11/10/1940 MRN [id number]   [facility] [place] 7NE-A Attending Lilia Leventhal, MD   Hosp Day # 2 PCP Annamarie Peters MD     Assessment:  hypotension-resolved distress.   HEENT: incision over right eyebrow c/d/i. mucous membranes moist, sclera anicteric  Neck: Supple with no JVD  Cardiac:   RRR, normal S1 S2,  no murmurs/gallops  Lungs:  Clear to auscultation bilaterally, good chest wall expansion  Abdomen: Soft,

## 2018-08-29 NOTE — CM/SW NOTE
Pt is a 64 yo female admitted for sepsis. Pt is a long-term resident at [facility]. Pt is wheelchair bound. Pt has been there for 4 years.   Spoke with pt's dtr Osiris Gudino who confirmed that pt will return to [facility]

## 2018-08-29 NOTE — PROGRESS NOTES
[facility]  Progress Note     Vasquezwinnie SwansonUpson Patient Status:  Inpatient    11/10/1940 MRN [id number]   [facility] 4SW-A Attending Manfred Gottron, MD   Hosp Day # 2 PCP Drake Izaguirre MD     Chief Complaint: unresponsive     S: [facility] Pantoprazole Sodium  20 mg Oral QAM AC   • atorvastatin  10 mg Oral Nightly   • Heparin Sodium (Porcine)  5,000 Units Subcutaneous Q8H [facility]   • aspirin  325 mg Oral Daily   • Donepezil HCl  10 mg Oral Nightly   • QUEtiapine Fumarate  25 mg Oral Nightly   •

## 2018-08-29 NOTE — OCCUPATIONAL THERAPY NOTE
OCCUPATIONAL THERAPY QUICK EVALUATION - INPATIENT    Room Number: 7414/6564-S  Evaluation Date: 8/29/2018     Type of Evaluation: Quick Eval  Presenting Problem: unresponsive, dehydration, bradycardia    Physician Order: IP Consult to Occupational Therapy History  Past Surgical History:  No date: ANGIOGRAM  10/24/07: ANGIOPLASTY (CORONARY)      Comment: 90% LADp (3.0x16mm Taxus), 90% D1 (2.5x12mm                Vision)  10/29/07: ANGIOPLASTY (CORONARY)      Comment: 100% diagonal, unable to wire  No date: A toilet, bedpan or urinal? : Total  -   Putting on and taking off regular upper body clothing?: A Lot  -   Taking care of personal grooming such as brushing teeth?: A Little  -   Eating meals?: A Little    AM-PAC Score:  Score: 11  Approx Degree of Impairme will be able to reach baseline level prior to discharge from hospital w/ continued activity promotion. Will defer further functional transfer training to PT. No further skilled OT intervention warranted at this time.     Patient Complexity  Occupational P

## 2018-08-30 VITALS
RESPIRATION RATE: 18 BRPM | DIASTOLIC BLOOD PRESSURE: 83 MMHG | HEART RATE: 71 BPM | BODY MASS INDEX: 31 KG/M2 | WEIGHT: 183.44 LBS | SYSTOLIC BLOOD PRESSURE: 156 MMHG | TEMPERATURE: 99 F | OXYGEN SATURATION: 99 %

## 2018-08-30 LAB
GLUCOSE BLD-MCNC: 209 MG/DL (ref 65–99)
GLUCOSE BLD-MCNC: 236 MG/DL (ref 65–99)

## 2018-08-30 PROCEDURE — 99239 HOSP IP/OBS DSCHRG MGMT >30: CPT | Performed by: HOSPITALIST

## 2018-08-30 RX ORDER — AMLODIPINE BESYLATE 5 MG/1
5 TABLET ORAL DAILY
Status: DISCONTINUED | OUTPATIENT
Start: 2018-08-30 | End: 2018-08-30

## 2018-08-30 RX ORDER — FUROSEMIDE 40 MG/1
40 TABLET ORAL DAILY
Status: DISCONTINUED | OUTPATIENT
Start: 2018-08-30 | End: 2018-08-30

## 2018-08-30 RX ORDER — FUROSEMIDE 40 MG/1
40 TABLET ORAL DAILY
Qty: 30 TABLET | Refills: 3 | Status: SHIPPED | OUTPATIENT
Start: 2018-08-30 | End: 2018-11-10

## 2018-08-30 NOTE — DISCHARGE SUMMARY
[facility]PITAL[facility]  DISCHARGE SUMMARY     Brooke Marinelli Patient Status:  Inpatient    11/10/1940 MRN [id number]   [facility] 7NE-A Attending No att. providers found   Hosp Day # 3 PCP Cait Manley MD     Date of Admission: 2018  Elias diuretic dose.      Procedures during hospitalization:   • none    Incidental or significant findings and recommendations (brief descriptions):  • none    Lab/Test results pending at Discharge:   · none    Consultants:  • Cardiology, Critical care     Disch 2 (two) times daily. Refills:  0     loratadine 10 MG Tabs  Commonly known as:  CLARITIN      Take 10 mg by mouth daily. Refills:  0     Methenamine Hippurate 1 g Tabs  Commonly known as:  HIPREX      Take 1 g by mouth daily.    Refills:  0     [place] [address]  [address] [place] [address] [phone]    Schedule an appointment as soon as possible for a visit in 2 weeks  As needed    Rito Rand [address]    Schedule an ap

## 2018-08-30 NOTE — CM/SW NOTE
Spoke with pt's RN who stated pt can return to assisted today. SW provided assisted phone number for RN report. Spoke with pt's dtr, Bárbara Atkinson who expressed agreement with DC plan for today. Ambulance transport requested for 3pm today. Updated pt's RN.   Social worke

## 2018-08-30 NOTE — PROGRESS NOTES
[facility]  Progress Note     Justine Bey Patient Status:  Inpatient    11/10/1940 MRN [id number]   [facility] 4SW-A Attending Kashmir Brown MD   Hosp Day # 3 PCP Selina Covington MD     Chief Complaint: unresponsive     S:  Flora Wang 40 mg Oral Daily   • AmLODIPine Besylate  5 mg Oral Daily   • Pantoprazole Sodium  20 mg Oral QAM AC   • atorvastatin  10 mg Oral Nightly   • Heparin Sodium (Porcine)  5,000 Units Subcutaneous Q8H [facility]   • aspirin  325 mg Oral Daily   • Donepezil HCl  10 mg

## 2018-08-30 NOTE — PROGRESS NOTES
[facility]  Cardiology Progress Note    Antoni Hebert Patient Status:  Inpatient    11/10/1940 MRN [id number]   [facility] 7NE-A Attending Matt Osorio MD   Hosp Day # 3 PCP Fidel Shields MD     Assessment:  hypotension-resolved kg)      Physical Exam:  General: alert, no apparent distress.   HEENT: incision over right eyebrow c/d/i. mucous membranes moist, sclera anicteric  Neck: Supple with no JVD  Cardiac:   RRR, normal S1 S2,  no murmurs/gallops  Lungs:  Clear to auscultation b

## 2018-08-30 NOTE — PLAN OF CARE
Assumed patient care 0730. Notified by night RN of HR 30-40s briefly while asleep. Hospitalist and Cards notified. Patient alert 2-3, freq reorientation provided. Chronic mooney in place. Pt has suture above rt eye, dry and intact.  Mepilex present to sacral Discharge

## 2018-08-30 NOTE — PLAN OF CARE
Assumed pt care @ 1930. Pt alert and oriented x 2-3, impaired short term memory, sutured site above right eyebrow dry and intact, breathing unlabored on room air. With chronic mooney draining cloudy yellow urine, urine cx still pending.  Not c/o any pain

## 2018-09-10 DIAGNOSIS — E53.8 B12 DEFICIENCY: ICD-10-CM

## 2018-09-11 RX ORDER — CYANOCOBALAMIN 1000 UG/ML
INJECTION INTRAMUSCULAR; SUBCUTANEOUS
Refills: 11 | OUTPATIENT
Start: 2018-09-11

## 2018-10-28 ENCOUNTER — HOSPITAL ENCOUNTER (EMERGENCY)
Facility: HOSPITAL | Age: 78
Discharge: HOME | End: 2018-10-29
Attending: EMERGENCY MEDICINE
Payer: MEDICARE

## 2018-10-28 ENCOUNTER — APPOINTMENT (OUTPATIENT)
Dept: GENERAL RADIOLOGY | Facility: HOSPITAL | Age: 78
End: 2018-10-28
Attending: EMERGENCY MEDICINE
Payer: MEDICARE

## 2018-10-28 DIAGNOSIS — R55 SYNCOPE, UNSPECIFIED SYNCOPE TYPE: Primary | ICD-10-CM

## 2018-10-28 DIAGNOSIS — I95.9 HYPOTENSION, UNSPECIFIED HYPOTENSION TYPE: ICD-10-CM

## 2018-10-28 PROCEDURE — 84439 ASSAY OF FREE THYROXINE: CPT | Performed by: EMERGENCY MEDICINE

## 2018-10-28 PROCEDURE — 99285 EMERGENCY DEPT VISIT HI MDM: CPT | Performed by: EMERGENCY MEDICINE

## 2018-10-28 PROCEDURE — 84443 ASSAY THYROID STIM HORMONE: CPT | Performed by: EMERGENCY MEDICINE

## 2018-10-28 PROCEDURE — 85025 COMPLETE CBC W/AUTO DIFF WBC: CPT | Performed by: EMERGENCY MEDICINE

## 2018-10-28 PROCEDURE — 82962 GLUCOSE BLOOD TEST: CPT

## 2018-10-28 PROCEDURE — 96365 THER/PROPH/DIAG IV INF INIT: CPT | Performed by: EMERGENCY MEDICINE

## 2018-10-28 PROCEDURE — 93005 ELECTROCARDIOGRAM TRACING: CPT

## 2018-10-28 PROCEDURE — 83735 ASSAY OF MAGNESIUM: CPT | Performed by: EMERGENCY MEDICINE

## 2018-10-28 PROCEDURE — 87040 BLOOD CULTURE FOR BACTERIA: CPT | Performed by: EMERGENCY MEDICINE

## 2018-10-28 PROCEDURE — 96361 HYDRATE IV INFUSION ADD-ON: CPT | Performed by: EMERGENCY MEDICINE

## 2018-10-28 PROCEDURE — 84484 ASSAY OF TROPONIN QUANT: CPT | Performed by: EMERGENCY MEDICINE

## 2018-10-28 PROCEDURE — 80053 COMPREHEN METABOLIC PANEL: CPT | Performed by: EMERGENCY MEDICINE

## 2018-10-28 PROCEDURE — 71045 X-RAY EXAM CHEST 1 VIEW: CPT | Performed by: EMERGENCY MEDICINE

## 2018-10-28 PROCEDURE — 93010 ELECTROCARDIOGRAM REPORT: CPT | Performed by: EMERGENCY MEDICINE

## 2018-10-28 RX ORDER — SODIUM CHLORIDE 9 MG/ML
INJECTION, SOLUTION INTRAVENOUS ONCE
Status: COMPLETED | OUTPATIENT
Start: 2018-10-28 | End: 2018-10-29

## 2018-10-28 RX ORDER — SODIUM CHLORIDE 9 MG/ML
INJECTION, SOLUTION INTRAVENOUS CONTINUOUS
Status: CANCELLED | OUTPATIENT
Start: 2018-10-28 | End: 2018-10-29

## 2018-10-29 ENCOUNTER — APPOINTMENT (OUTPATIENT)
Dept: CT IMAGING | Facility: HOSPITAL | Age: 78
End: 2018-10-29
Attending: EMERGENCY MEDICINE
Payer: MEDICARE

## 2018-10-29 VITALS
HEIGHT: 65 IN | WEIGHT: 200 LBS | SYSTOLIC BLOOD PRESSURE: 156 MMHG | HEART RATE: 60 BPM | DIASTOLIC BLOOD PRESSURE: 58 MMHG | OXYGEN SATURATION: 99 % | BODY MASS INDEX: 33.32 KG/M2 | TEMPERATURE: 97 F | RESPIRATION RATE: 12 BRPM

## 2018-10-29 PROCEDURE — 87046 STOOL CULTR AEROBIC BACT EA: CPT | Performed by: EMERGENCY MEDICINE

## 2018-10-29 PROCEDURE — 87186 SC STD MICRODIL/AGAR DIL: CPT | Performed by: EMERGENCY MEDICINE

## 2018-10-29 PROCEDURE — 87077 CULTURE AEROBIC IDENTIFY: CPT | Performed by: EMERGENCY MEDICINE

## 2018-10-29 PROCEDURE — 87045 FECES CULTURE AEROBIC BACT: CPT | Performed by: EMERGENCY MEDICINE

## 2018-10-29 PROCEDURE — 87427 SHIGA-LIKE TOXIN AG IA: CPT | Performed by: EMERGENCY MEDICINE

## 2018-10-29 PROCEDURE — 84484 ASSAY OF TROPONIN QUANT: CPT | Performed by: EMERGENCY MEDICINE

## 2018-10-29 PROCEDURE — 87086 URINE CULTURE/COLONY COUNT: CPT | Performed by: EMERGENCY MEDICINE

## 2018-10-29 PROCEDURE — 87493 C DIFF AMPLIFIED PROBE: CPT | Performed by: EMERGENCY MEDICINE

## 2018-10-29 PROCEDURE — 74176 CT ABD & PELVIS W/O CONTRAST: CPT | Performed by: EMERGENCY MEDICINE

## 2018-10-29 PROCEDURE — 81001 URINALYSIS AUTO W/SCOPE: CPT | Performed by: EMERGENCY MEDICINE

## 2018-10-29 RX ORDER — SODIUM CHLORIDE 9 MG/ML
INJECTION, SOLUTION INTRAVENOUS ONCE
Status: COMPLETED | OUTPATIENT
Start: 2018-10-29 | End: 2018-10-29

## 2018-10-29 NOTE — ED NOTES
Mooney cath re inserted. Having a difficult time re inserting mooney cath. Unable to fully advance fr 16 mooney even after getting urine output. Changed to 14 fr and able to fully advance mooney cath.

## 2018-10-29 NOTE — ED NOTES
Attempted to call report to Neida Dahl. No answer. Message left on answering machine to call ER for report.

## 2018-10-29 NOTE — ED INITIAL ASSESSMENT (HPI)
Pt brought by EMS from [facility] living, memory care for AMS. BP 73/33 and responsive to pain on EMS arrival. 200cc bolus given, BP 90/43.  Patient responds to verbal commands on arrival. Daughter at bedside, states pt was fine at dinner, talk

## 2018-10-29 NOTE — ED NOTES
[facility] NEVER CALLED BACK WITH AN E.T.A. SO I CALLED BACK [facility] AND THEY SAID NOBODY EVER CALLED AROUND FOR A QUICKER E. T.A. SHE SAID SORRY AND SHE WILL CALL NOW BUT THE ORIGINAL E.T.A.  THEY GAVE ME IS HERE IN 30 MINUTES

## 2018-10-29 NOTE — ED PROVIDER NOTES
Patient Seen in: [facility] Emergency Department    History   Patient presents with:  Altered Mental Status (neurologic)    Stated Complaint: AMS    HPI    25-year-old with a history of COPD, coronary artery disease, congestive heart failure, chroni transfusion     50 years ago   • Hyperlipidemia LDL goal <70    • Hypertension    • Neuropathy, diabetic ([facility] [address]    • Obesity (BMI 30-39. 9)     BMI 32   • Osteoarthrosis, unspecified whether generalized or localized, unspecified site    • Recurrent UTI    • Lungs are clear to auscultation bilaterally. Abdomen: Soft, nontender across the upper abdomen, mild reported tenderness across the lower abdomen skin changes, obese but nondistended. Back: No CVA tenderness. Extremities: No edema.   Neuro: No facial d PLATELET    Narrative: The following orders were created for panel order CBC WITH DIFFERENTIAL WITH PLATELET.   Procedure                               Abnormality         Status                     ---------                               ----------- enteritis/viral diarrhea syndrome as above. Mesenteric panniculitis is conceivable, although the absence of epigastric pain and enlarged lymph nodes suggests against it. No free intraperitoneal fluid or gas. Appendix is not seen.     The kidneys are about a possible abnormal heart rhythm given her bradycardia in the past and persistent bradycardia here, additionally I would be concerned about early signs of sepsis possibly from a bladder infection and possibly of an infectious diarrhea.   Her daughter

## 2018-11-01 RX ORDER — NITROFURANTOIN 25; 75 MG/1; MG/1
100 CAPSULE ORAL 2 TIMES DAILY
Qty: 20 CAPSULE | Refills: 0 | Status: SHIPPED | OUTPATIENT
Start: 2018-11-01 | End: 2018-11-11

## 2018-11-19 PROBLEM — R60.0 EDEMA OF BOTH LEGS: Status: ACTIVE | Noted: 2018-11-19

## 2019-01-01 ENCOUNTER — APPOINTMENT (OUTPATIENT)
Dept: RADIATION ONCOLOGY | Facility: HOSPITAL | Age: 79
End: 2019-01-01
Attending: INTERNAL MEDICINE
Payer: MEDICARE

## 2019-01-01 ENCOUNTER — HOSPITAL ENCOUNTER (INPATIENT)
Facility: HOSPITAL | Age: 79
LOS: 5 days | Discharge: NURSING, REHAB OR LONG-TERM CARE | DRG: 698 | End: 2019-01-01
Attending: EMERGENCY MEDICINE | Admitting: INTERNAL MEDICINE
Payer: MEDICARE

## 2019-01-01 ENCOUNTER — TELEPHONE (OUTPATIENT)
Dept: HEMATOLOGY/ONCOLOGY | Facility: HOSPITAL | Age: 79
End: 2019-01-01

## 2019-01-01 ENCOUNTER — APPOINTMENT (OUTPATIENT)
Dept: GENERAL RADIOLOGY | Facility: HOSPITAL | Age: 79
DRG: 536 | End: 2019-01-01
Attending: ORTHOPAEDIC SURGERY
Payer: MEDICARE

## 2019-01-01 ENCOUNTER — HOSPITAL ENCOUNTER (INPATIENT)
Facility: HOSPITAL | Age: 79
LOS: 2 days | Discharge: NURSING, REHAB OR LONG-TERM CARE | DRG: 536 | End: 2019-01-01
Attending: EMERGENCY MEDICINE | Admitting: INTERNAL MEDICINE
Payer: MEDICARE

## 2019-01-01 ENCOUNTER — HOSPITAL ENCOUNTER (EMERGENCY)
Facility: HOSPITAL | Age: 79
Discharge: HOME | End: 2019-01-01
Attending: EMERGENCY MEDICINE
Payer: MEDICARE

## 2019-01-01 ENCOUNTER — APPOINTMENT (OUTPATIENT)
Dept: CT IMAGING | Facility: HOSPITAL | Age: 79
End: 2019-01-01
Attending: EMERGENCY MEDICINE
Payer: MEDICARE

## 2019-01-01 ENCOUNTER — APPOINTMENT (OUTPATIENT)
Dept: GENERAL RADIOLOGY | Facility: HOSPITAL | Age: 79
End: 2019-01-01
Attending: EMERGENCY MEDICINE
Payer: MEDICARE

## 2019-01-01 ENCOUNTER — APPOINTMENT (OUTPATIENT)
Dept: GENERAL RADIOLOGY | Facility: HOSPITAL | Age: 79
DRG: 698 | End: 2019-01-01
Attending: EMERGENCY MEDICINE
Payer: MEDICARE

## 2019-01-01 ENCOUNTER — APPOINTMENT (OUTPATIENT)
Dept: GENERAL RADIOLOGY | Facility: HOSPITAL | Age: 79
DRG: 536 | End: 2019-01-01
Attending: EMERGENCY MEDICINE
Payer: MEDICARE

## 2019-01-01 ENCOUNTER — APPOINTMENT (OUTPATIENT)
Dept: CT IMAGING | Facility: HOSPITAL | Age: 79
DRG: 698 | End: 2019-01-01
Attending: EMERGENCY MEDICINE
Payer: MEDICARE

## 2019-01-01 VITALS
WEIGHT: 186.13 LBS | OXYGEN SATURATION: 97 % | TEMPERATURE: 98 F | RESPIRATION RATE: 17 BRPM | HEIGHT: 64.02 IN | SYSTOLIC BLOOD PRESSURE: 157 MMHG | BODY MASS INDEX: 31.78 KG/M2 | HEART RATE: 59 BPM | DIASTOLIC BLOOD PRESSURE: 57 MMHG

## 2019-01-01 VITALS
RESPIRATION RATE: 21 BRPM | DIASTOLIC BLOOD PRESSURE: 86 MMHG | HEART RATE: 93 BPM | WEIGHT: 199.94 LBS | SYSTOLIC BLOOD PRESSURE: 145 MMHG | BODY MASS INDEX: 34.13 KG/M2 | HEIGHT: 64 IN | OXYGEN SATURATION: 96 %

## 2019-01-01 VITALS
TEMPERATURE: 98 F | HEART RATE: 61 BPM | WEIGHT: 186.06 LBS | OXYGEN SATURATION: 100 % | HEIGHT: 64 IN | RESPIRATION RATE: 16 BRPM | SYSTOLIC BLOOD PRESSURE: 132 MMHG | BODY MASS INDEX: 31.77 KG/M2 | DIASTOLIC BLOOD PRESSURE: 50 MMHG

## 2019-01-01 DIAGNOSIS — A41.9 SEPSIS DUE TO URINARY TRACT INFECTION (HCC): Primary | ICD-10-CM

## 2019-01-01 DIAGNOSIS — M89.8X5 LYTIC BONE LESION OF HIP: ICD-10-CM

## 2019-01-01 DIAGNOSIS — S00.83XA CONTUSION OF FACE, INITIAL ENCOUNTER: Primary | ICD-10-CM

## 2019-01-01 DIAGNOSIS — E16.2 HYPOGLYCEMIA: Primary | ICD-10-CM

## 2019-01-01 DIAGNOSIS — N39.0 SEPSIS DUE TO URINARY TRACT INFECTION (HCC): Primary | ICD-10-CM

## 2019-01-01 DIAGNOSIS — S01.81XA LACERATION OF FOREHEAD, INITIAL ENCOUNTER: ICD-10-CM

## 2019-01-01 DIAGNOSIS — T14.8XXA MULTIPLE SKIN TEARS: ICD-10-CM

## 2019-01-01 DIAGNOSIS — S32.402A CLOSED NONDISPLACED FRACTURE OF LEFT ACETABULUM, UNSPECIFIED PORTION OF ACETABULUM, INITIAL ENCOUNTER (HCC): ICD-10-CM

## 2019-01-01 DIAGNOSIS — S09.8XXA BLUNT HEAD TRAUMA, INITIAL ENCOUNTER: ICD-10-CM

## 2019-01-01 DIAGNOSIS — S80.212A ABRASION OF LEFT KNEE, INITIAL ENCOUNTER: ICD-10-CM

## 2019-01-01 LAB
ALBUMIN SERPL-MCNC: 1.8 G/DL (ref 3.4–5)
ALBUMIN/GLOB SERPL: 0.4 {RATIO} (ref 1–2)
ALP LIVER SERPL-CCNC: 80 U/L (ref 55–142)
ALP LIVER SERPL-CCNC: 84 U/L (ref 55–142)
ALP LIVER SERPL-CCNC: 89 U/L (ref 55–142)
ALT SERPL-CCNC: 8 U/L (ref 13–56)
ALT SERPL-CCNC: 8 U/L (ref 13–56)
ALT SERPL-CCNC: 9 U/L (ref 13–56)
ANION GAP SERPL CALC-SCNC: 10 MMOL/L (ref 0–18)
ANION GAP SERPL CALC-SCNC: 10 MMOL/L (ref 0–18)
ANION GAP SERPL CALC-SCNC: 8 MMOL/L (ref 0–18)
ANION GAP SERPL CALC-SCNC: 8 MMOL/L (ref 0–18)
ANION GAP SERPL CALC-SCNC: 9 MMOL/L (ref 0–18)
AST SERPL-CCNC: 10 U/L (ref 15–37)
AST SERPL-CCNC: 12 U/L (ref 15–37)
AST SERPL-CCNC: 13 U/L (ref 15–37)
ATRIAL RATE: 65 BPM
BASOPHILS # BLD AUTO: 0.03 X10(3) UL (ref 0–0.2)
BASOPHILS # BLD AUTO: 0.03 X10(3) UL (ref 0–0.2)
BASOPHILS # BLD AUTO: 0.04 X10(3) UL (ref 0–0.2)
BASOPHILS NFR BLD AUTO: 0.1 %
BASOPHILS NFR BLD AUTO: 0.2 %
BASOPHILS NFR BLD AUTO: 0.4 %
BASOPHILS NFR BLD AUTO: 0.4 %
BASOPHILS NFR BLD AUTO: 0.5 %
BILIRUB SERPL-MCNC: 0.1 MG/DL (ref 0.1–2)
BILIRUB SERPL-MCNC: 0.2 MG/DL (ref 0.1–2)
BILIRUB SERPL-MCNC: 0.2 MG/DL (ref 0.1–2)
BILIRUB UR QL STRIP.AUTO: NEGATIVE
BUN BLD-MCNC: 30 MG/DL (ref 7–18)
BUN BLD-MCNC: 37 MG/DL (ref 7–18)
BUN BLD-MCNC: 49 MG/DL (ref 7–18)
BUN BLD-MCNC: 67 MG/DL (ref 7–18)
BUN BLD-MCNC: 68 MG/DL (ref 7–18)
BUN/CREAT SERPL: 20.5 (ref 10–20)
BUN/CREAT SERPL: 23.1 (ref 10–20)
BUN/CREAT SERPL: 24.3 (ref 10–20)
BUN/CREAT SERPL: 25.3 (ref 10–20)
BUN/CREAT SERPL: 26.3 (ref 10–20)
CALCIUM BLD-MCNC: 7.8 MG/DL (ref 8.5–10.1)
CALCIUM BLD-MCNC: 7.9 MG/DL (ref 8.5–10.1)
CALCIUM BLD-MCNC: 8 MG/DL (ref 8.5–10.1)
CALCIUM BLD-MCNC: 8.1 MG/DL (ref 8.5–10.1)
CALCIUM BLD-MCNC: 8.4 MG/DL (ref 8.5–10.1)
CHLORIDE SERPL-SCNC: 110 MMOL/L (ref 98–107)
CHLORIDE SERPL-SCNC: 114 MMOL/L (ref 98–107)
CHLORIDE SERPL-SCNC: 115 MMOL/L (ref 98–107)
CHLORIDE SERPL-SCNC: 115 MMOL/L (ref 98–107)
CHLORIDE SERPL-SCNC: 117 MMOL/L (ref 98–107)
CO2 SERPL-SCNC: 19 MMOL/L (ref 21–32)
CO2 SERPL-SCNC: 20 MMOL/L (ref 21–32)
CO2 SERPL-SCNC: 21 MMOL/L (ref 21–32)
CO2 SERPL-SCNC: 23 MMOL/L (ref 21–32)
CO2 SERPL-SCNC: 23 MMOL/L (ref 21–32)
COLOR UR AUTO: YELLOW
CREAT BLD-MCNC: 1.46 MG/DL (ref 0.55–1.02)
CREAT BLD-MCNC: 1.52 MG/DL (ref 0.55–1.02)
CREAT BLD-MCNC: 1.94 MG/DL (ref 0.55–1.02)
CREAT BLD-MCNC: 2.55 MG/DL (ref 0.55–1.02)
CREAT BLD-MCNC: 2.94 MG/DL (ref 0.55–1.02)
DEPRECATED RDW RBC AUTO: 42.1 FL (ref 35.1–46.3)
DEPRECATED RDW RBC AUTO: 43.2 FL (ref 35.1–46.3)
DEPRECATED RDW RBC AUTO: 45 FL (ref 35.1–46.3)
DEPRECATED RDW RBC AUTO: 46.1 FL (ref 35.1–46.3)
DEPRECATED RDW RBC AUTO: 48.3 FL (ref 35.1–46.3)
EOSINOPHIL # BLD AUTO: 0 X10(3) UL (ref 0–0.7)
EOSINOPHIL # BLD AUTO: 0.32 X10(3) UL (ref 0–0.7)
EOSINOPHIL # BLD AUTO: 0.42 X10(3) UL (ref 0–0.7)
EOSINOPHIL # BLD AUTO: 0.53 X10(3) UL (ref 0–0.7)
EOSINOPHIL # BLD AUTO: 0.55 X10(3) UL (ref 0–0.7)
EOSINOPHIL NFR BLD AUTO: 0 %
EOSINOPHIL NFR BLD AUTO: 2.9 %
EOSINOPHIL NFR BLD AUTO: 4.3 %
EOSINOPHIL NFR BLD AUTO: 5.2 %
EOSINOPHIL NFR BLD AUTO: 5.2 %
ERYTHROCYTE [DISTWIDTH] IN BLOOD BY AUTOMATED COUNT: 13.4 % (ref 11–15)
ERYTHROCYTE [DISTWIDTH] IN BLOOD BY AUTOMATED COUNT: 13.4 % (ref 11–15)
ERYTHROCYTE [DISTWIDTH] IN BLOOD BY AUTOMATED COUNT: 13.8 % (ref 11–15)
ERYTHROCYTE [DISTWIDTH] IN BLOOD BY AUTOMATED COUNT: 14.3 % (ref 11–15)
ERYTHROCYTE [DISTWIDTH] IN BLOOD BY AUTOMATED COUNT: 14.3 % (ref 11–15)
GLOBULIN PLAS-MCNC: 4.1 G/DL (ref 2.8–4.4)
GLOBULIN PLAS-MCNC: 4.1 G/DL (ref 2.8–4.4)
GLOBULIN PLAS-MCNC: 4.4 G/DL (ref 2.8–4.4)
GLUCOSE BLD-MCNC: 102 MG/DL (ref 70–99)
GLUCOSE BLD-MCNC: 105 MG/DL (ref 70–99)
GLUCOSE BLD-MCNC: 128 MG/DL (ref 70–99)
GLUCOSE BLD-MCNC: 129 MG/DL (ref 70–99)
GLUCOSE BLD-MCNC: 140 MG/DL (ref 70–99)
GLUCOSE BLD-MCNC: 143 MG/DL (ref 70–99)
GLUCOSE BLD-MCNC: 144 MG/DL (ref 70–99)
GLUCOSE BLD-MCNC: 145 MG/DL (ref 70–99)
GLUCOSE BLD-MCNC: 154 MG/DL (ref 70–99)
GLUCOSE BLD-MCNC: 158 MG/DL (ref 70–99)
GLUCOSE BLD-MCNC: 164 MG/DL (ref 70–99)
GLUCOSE BLD-MCNC: 169 MG/DL (ref 70–99)
GLUCOSE BLD-MCNC: 175 MG/DL (ref 70–99)
GLUCOSE BLD-MCNC: 175 MG/DL (ref 70–99)
GLUCOSE BLD-MCNC: 185 MG/DL (ref 70–99)
GLUCOSE BLD-MCNC: 185 MG/DL (ref 70–99)
GLUCOSE BLD-MCNC: 193 MG/DL (ref 70–99)
GLUCOSE BLD-MCNC: 196 MG/DL (ref 70–99)
GLUCOSE BLD-MCNC: 209 MG/DL (ref 70–99)
GLUCOSE BLD-MCNC: 210 MG/DL (ref 70–99)
GLUCOSE BLD-MCNC: 212 MG/DL (ref 70–99)
GLUCOSE BLD-MCNC: 220 MG/DL (ref 70–99)
GLUCOSE BLD-MCNC: 227 MG/DL (ref 70–99)
GLUCOSE BLD-MCNC: 242 MG/DL (ref 70–99)
GLUCOSE BLD-MCNC: 271 MG/DL (ref 70–99)
GLUCOSE BLD-MCNC: 272 MG/DL (ref 70–99)
GLUCOSE BLD-MCNC: 294 MG/DL (ref 70–99)
GLUCOSE BLD-MCNC: 35 MG/DL (ref 70–99)
GLUCOSE BLD-MCNC: 44 MG/DL (ref 70–99)
GLUCOSE BLD-MCNC: 67 MG/DL (ref 70–99)
GLUCOSE BLD-MCNC: 75 MG/DL (ref 70–99)
GLUCOSE BLD-MCNC: 80 MG/DL (ref 70–99)
GLUCOSE UR STRIP.AUTO-MCNC: 50 MG/DL
HCT VFR BLD AUTO: 24.6 % (ref 35–48)
HCT VFR BLD AUTO: 24.7 % (ref 35–48)
HCT VFR BLD AUTO: 26.4 % (ref 35–48)
HCT VFR BLD AUTO: 27.2 % (ref 35–48)
HCT VFR BLD AUTO: 28.2 % (ref 35–48)
HGB BLD-MCNC: 7.7 G/DL (ref 12–16)
HGB BLD-MCNC: 7.9 G/DL (ref 12–16)
HGB BLD-MCNC: 8.5 G/DL (ref 12–16)
HGB BLD-MCNC: 8.7 G/DL (ref 12–16)
HGB BLD-MCNC: 9.2 G/DL (ref 12–16)
IMM GRANULOCYTES # BLD AUTO: 0.03 X10(3) UL (ref 0–1)
IMM GRANULOCYTES # BLD AUTO: 0.05 X10(3) UL (ref 0–1)
IMM GRANULOCYTES # BLD AUTO: 0.06 X10(3) UL (ref 0–1)
IMM GRANULOCYTES # BLD AUTO: 0.14 X10(3) UL (ref 0–1)
IMM GRANULOCYTES # BLD AUTO: 0.44 X10(3) UL (ref 0–1)
IMM GRANULOCYTES NFR BLD: 0.3 %
IMM GRANULOCYTES NFR BLD: 0.7 %
IMM GRANULOCYTES NFR BLD: 0.7 %
IMM GRANULOCYTES NFR BLD: 0.8 %
IMM GRANULOCYTES NFR BLD: 1.5 %
KETONES UR STRIP.AUTO-MCNC: NEGATIVE MG/DL
LACTATE SERPL-SCNC: 1.2 MMOL/L (ref 0.4–2)
LACTATE SERPL-SCNC: 3.3 MMOL/L (ref 0.4–2)
LYMPHOCYTES # BLD AUTO: 0.53 X10(3) UL (ref 1–4)
LYMPHOCYTES # BLD AUTO: 1.11 X10(3) UL (ref 1–4)
LYMPHOCYTES # BLD AUTO: 1.14 X10(3) UL (ref 1–4)
LYMPHOCYTES # BLD AUTO: 1.28 X10(3) UL (ref 1–4)
LYMPHOCYTES # BLD AUTO: 1.75 X10(3) UL (ref 1–4)
LYMPHOCYTES NFR BLD AUTO: 1.8 %
LYMPHOCYTES NFR BLD AUTO: 12.1 %
LYMPHOCYTES NFR BLD AUTO: 13.7 %
LYMPHOCYTES NFR BLD AUTO: 15.3 %
LYMPHOCYTES NFR BLD AUTO: 9.4 %
M PROTEIN MFR SERPL ELPH: 5.9 G/DL (ref 6.4–8.2)
M PROTEIN MFR SERPL ELPH: 5.9 G/DL (ref 6.4–8.2)
M PROTEIN MFR SERPL ELPH: 6.2 G/DL (ref 6.4–8.2)
MCH RBC QN AUTO: 28.3 PG (ref 26–34)
MCH RBC QN AUTO: 28.4 PG (ref 26–34)
MCH RBC QN AUTO: 28.4 PG (ref 26–34)
MCH RBC QN AUTO: 28.6 PG (ref 26–34)
MCH RBC QN AUTO: 28.8 PG (ref 26–34)
MCHC RBC AUTO-ENTMCNC: 31.3 G/DL (ref 31–37)
MCHC RBC AUTO-ENTMCNC: 32 G/DL (ref 31–37)
MCHC RBC AUTO-ENTMCNC: 32 G/DL (ref 31–37)
MCHC RBC AUTO-ENTMCNC: 32.2 G/DL (ref 31–37)
MCHC RBC AUTO-ENTMCNC: 32.6 G/DL (ref 31–37)
MCV RBC AUTO: 87 FL (ref 80–100)
MCV RBC AUTO: 88 FL (ref 80–100)
MCV RBC AUTO: 88.8 FL (ref 80–100)
MCV RBC AUTO: 90.1 FL (ref 80–100)
MCV RBC AUTO: 91.4 FL (ref 80–100)
MONOCYTES # BLD AUTO: 0.32 X10(3) UL (ref 0.1–1)
MONOCYTES # BLD AUTO: 0.37 X10(3) UL (ref 0.1–1)
MONOCYTES # BLD AUTO: 0.47 X10(3) UL (ref 0.1–1)
MONOCYTES # BLD AUTO: 0.55 X10(3) UL (ref 0.1–1)
MONOCYTES # BLD AUTO: 0.88 X10(3) UL (ref 0.1–1)
MONOCYTES NFR BLD AUTO: 1.6 %
MONOCYTES NFR BLD AUTO: 4.3 %
MONOCYTES NFR BLD AUTO: 4.6 %
MONOCYTES NFR BLD AUTO: 4.7 %
MONOCYTES NFR BLD AUTO: 5.2 %
NEUTROPHILS # BLD AUTO: 15.23 X10 (3) UL (ref 1.5–7.7)
NEUTROPHILS # BLD AUTO: 15.23 X10(3) UL (ref 1.5–7.7)
NEUTROPHILS # BLD AUTO: 27.66 X10 (3) UL (ref 1.5–7.7)
NEUTROPHILS # BLD AUTO: 27.66 X10(3) UL (ref 1.5–7.7)
NEUTROPHILS # BLD AUTO: 5.58 X10 (3) UL (ref 1.5–7.7)
NEUTROPHILS # BLD AUTO: 5.58 X10(3) UL (ref 1.5–7.7)
NEUTROPHILS # BLD AUTO: 6.12 X10 (3) UL (ref 1.5–7.7)
NEUTROPHILS # BLD AUTO: 6.12 X10(3) UL (ref 1.5–7.7)
NEUTROPHILS # BLD AUTO: 8.09 X10 (3) UL (ref 1.5–7.7)
NEUTROPHILS # BLD AUTO: 8.09 X10(3) UL (ref 1.5–7.7)
NEUTROPHILS NFR BLD AUTO: 75 %
NEUTROPHILS NFR BLD AUTO: 75.3 %
NEUTROPHILS NFR BLD AUTO: 76.8 %
NEUTROPHILS NFR BLD AUTO: 82 %
NEUTROPHILS NFR BLD AUTO: 95 %
NITRITE UR QL STRIP.AUTO: NEGATIVE
OSMOLALITY SERPL CALC.SUM OF ELEC: 312 MOSM/KG (ref 275–295)
OSMOLALITY SERPL CALC.SUM OF ELEC: 313 MOSM/KG (ref 275–295)
OSMOLALITY SERPL CALC.SUM OF ELEC: 316 MOSM/KG (ref 275–295)
OSMOLALITY SERPL CALC.SUM OF ELEC: 323 MOSM/KG (ref 275–295)
OSMOLALITY SERPL CALC.SUM OF ELEC: 327 MOSM/KG (ref 275–295)
P AXIS: 26 DEGREES
P-R INTERVAL: 210 MS
PH UR STRIP.AUTO: 5 [PH] (ref 4.5–8)
PLATELET # BLD AUTO: 160 10(3)UL (ref 150–450)
PLATELET # BLD AUTO: 167 10(3)UL (ref 150–450)
PLATELET # BLD AUTO: 178 10(3)UL (ref 150–450)
PLATELET # BLD AUTO: 179 10(3)UL (ref 150–450)
PLATELET # BLD AUTO: 193 10(3)UL (ref 150–450)
POTASSIUM SERPL-SCNC: 4.5 MMOL/L (ref 3.5–5.1)
POTASSIUM SERPL-SCNC: 4.5 MMOL/L (ref 3.5–5.1)
POTASSIUM SERPL-SCNC: 4.6 MMOL/L (ref 3.5–5.1)
POTASSIUM SERPL-SCNC: 4.7 MMOL/L (ref 3.5–5.1)
POTASSIUM SERPL-SCNC: 4.8 MMOL/L (ref 3.5–5.1)
PROT UR STRIP.AUTO-MCNC: 100 MG/DL
Q-T INTERVAL: 438 MS
QRS DURATION: 90 MS
QTC CALCULATION (BEZET): 455 MS
R AXIS: -31 DEGREES
RBC # BLD AUTO: 2.69 X10(6)UL (ref 3.8–5.3)
RBC # BLD AUTO: 2.78 X10(6)UL (ref 3.8–5.3)
RBC # BLD AUTO: 3 X10(6)UL (ref 3.8–5.3)
RBC # BLD AUTO: 3.02 X10(6)UL (ref 3.8–5.3)
RBC # BLD AUTO: 3.24 X10(6)UL (ref 3.8–5.3)
SODIUM SERPL-SCNC: 143 MMOL/L (ref 136–145)
SODIUM SERPL-SCNC: 144 MMOL/L (ref 136–145)
SODIUM SERPL-SCNC: 144 MMOL/L (ref 136–145)
SODIUM SERPL-SCNC: 145 MMOL/L (ref 136–145)
SODIUM SERPL-SCNC: 146 MMOL/L (ref 136–145)
SP GR UR STRIP.AUTO: 1.02 (ref 1–1.03)
T AXIS: 123 DEGREES
UROBILINOGEN UR STRIP.AUTO-MCNC: <2 MG/DL
VENTRICULAR RATE: 65 BPM
WBC # BLD AUTO: 10.5 X10(3) UL (ref 4–11)
WBC # BLD AUTO: 18.6 X10(3) UL (ref 4–11)
WBC # BLD AUTO: 29.1 X10(3) UL (ref 4–11)
WBC # BLD AUTO: 7.4 X10(3) UL (ref 4–11)
WBC # BLD AUTO: 8.1 X10(3) UL (ref 4–11)
WBC #/AREA URNS AUTO: >50 /HPF
WBC CLUMPS UR QL AUTO: PRESENT

## 2019-01-01 PROCEDURE — 87040 BLOOD CULTURE FOR BACTERIA: CPT | Performed by: EMERGENCY MEDICINE

## 2019-01-01 PROCEDURE — 99222 1ST HOSP IP/OBS MODERATE 55: CPT | Performed by: INTERNAL MEDICINE

## 2019-01-01 PROCEDURE — 71045 X-RAY EXAM CHEST 1 VIEW: CPT | Performed by: EMERGENCY MEDICINE

## 2019-01-01 PROCEDURE — 99284 EMERGENCY DEPT VISIT MOD MDM: CPT

## 2019-01-01 PROCEDURE — 93005 ELECTROCARDIOGRAM TRACING: CPT

## 2019-01-01 PROCEDURE — 70450 CT HEAD/BRAIN W/O DYE: CPT | Performed by: EMERGENCY MEDICINE

## 2019-01-01 PROCEDURE — 96365 THER/PROPH/DIAG IV INF INIT: CPT

## 2019-01-01 PROCEDURE — 87040 BLOOD CULTURE FOR BACTERIA: CPT | Performed by: INTERNAL MEDICINE

## 2019-01-01 PROCEDURE — 87077 CULTURE AEROBIC IDENTIFY: CPT | Performed by: INTERNAL MEDICINE

## 2019-01-01 PROCEDURE — 87077 CULTURE AEROBIC IDENTIFY: CPT | Performed by: EMERGENCY MEDICINE

## 2019-01-01 PROCEDURE — 99285 EMERGENCY DEPT VISIT HI MDM: CPT

## 2019-01-01 PROCEDURE — 82962 GLUCOSE BLOOD TEST: CPT

## 2019-01-01 PROCEDURE — 12011 RPR F/E/E/N/L/M 2.5 CM/<: CPT

## 2019-01-01 PROCEDURE — 99223 1ST HOSP IP/OBS HIGH 75: CPT | Performed by: INTERNAL MEDICINE

## 2019-01-01 PROCEDURE — 80048 BASIC METABOLIC PNL TOTAL CA: CPT | Performed by: INTERNAL MEDICINE

## 2019-01-01 PROCEDURE — 97161 PT EVAL LOW COMPLEX 20 MIN: CPT

## 2019-01-01 PROCEDURE — 87186 SC STD MICRODIL/AGAR DIL: CPT | Performed by: INTERNAL MEDICINE

## 2019-01-01 PROCEDURE — 85025 COMPLETE CBC W/AUTO DIFF WBC: CPT | Performed by: EMERGENCY MEDICINE

## 2019-01-01 PROCEDURE — 93010 ELECTROCARDIOGRAM REPORT: CPT

## 2019-01-01 PROCEDURE — 96361 HYDRATE IV INFUSION ADD-ON: CPT

## 2019-01-01 PROCEDURE — 73552 X-RAY EXAM OF FEMUR 2/>: CPT | Performed by: ORTHOPAEDIC SURGERY

## 2019-01-01 PROCEDURE — 73502 X-RAY EXAM HIP UNI 2-3 VIEWS: CPT | Performed by: EMERGENCY MEDICINE

## 2019-01-01 PROCEDURE — 87186 SC STD MICRODIL/AGAR DIL: CPT | Performed by: EMERGENCY MEDICINE

## 2019-01-01 PROCEDURE — 99231 SBSQ HOSP IP/OBS SF/LOW 25: CPT | Performed by: INTERNAL MEDICINE

## 2019-01-01 PROCEDURE — 85025 COMPLETE CBC W/AUTO DIFF WBC: CPT | Performed by: INTERNAL MEDICINE

## 2019-01-01 PROCEDURE — 83605 ASSAY OF LACTIC ACID: CPT | Performed by: EMERGENCY MEDICINE

## 2019-01-01 PROCEDURE — 87150 DNA/RNA AMPLIFIED PROBE: CPT | Performed by: EMERGENCY MEDICINE

## 2019-01-01 PROCEDURE — 73523 X-RAY EXAM HIPS BI 5/> VIEWS: CPT | Performed by: ORTHOPAEDIC SURGERY

## 2019-01-01 PROCEDURE — 87086 URINE CULTURE/COLONY COUNT: CPT | Performed by: INTERNAL MEDICINE

## 2019-01-01 PROCEDURE — 97165 OT EVAL LOW COMPLEX 30 MIN: CPT

## 2019-01-01 PROCEDURE — 36415 COLL VENOUS BLD VENIPUNCTURE: CPT

## 2019-01-01 PROCEDURE — 97530 THERAPEUTIC ACTIVITIES: CPT

## 2019-01-01 PROCEDURE — 99291 CRITICAL CARE FIRST HOUR: CPT

## 2019-01-01 PROCEDURE — 73560 X-RAY EXAM OF KNEE 1 OR 2: CPT | Performed by: EMERGENCY MEDICINE

## 2019-01-01 PROCEDURE — 80053 COMPREHEN METABOLIC PANEL: CPT | Performed by: INTERNAL MEDICINE

## 2019-01-01 PROCEDURE — 81001 URINALYSIS AUTO W/SCOPE: CPT | Performed by: INTERNAL MEDICINE

## 2019-01-01 PROCEDURE — 90471 IMMUNIZATION ADMIN: CPT

## 2019-01-01 PROCEDURE — 74176 CT ABD & PELVIS W/O CONTRAST: CPT | Performed by: EMERGENCY MEDICINE

## 2019-01-01 PROCEDURE — 99232 SBSQ HOSP IP/OBS MODERATE 35: CPT | Performed by: INTERNAL MEDICINE

## 2019-01-01 PROCEDURE — 87150 DNA/RNA AMPLIFIED PROBE: CPT | Performed by: INTERNAL MEDICINE

## 2019-01-01 PROCEDURE — 80053 COMPREHEN METABOLIC PANEL: CPT | Performed by: EMERGENCY MEDICINE

## 2019-01-01 PROCEDURE — 51702 INSERT TEMP BLADDER CATH: CPT

## 2019-01-01 PROCEDURE — 72125 CT NECK SPINE W/O DYE: CPT | Performed by: EMERGENCY MEDICINE

## 2019-01-01 RX ORDER — MONTELUKAST SODIUM 10 MG/1
10 TABLET ORAL NIGHTLY
COMMUNITY

## 2019-01-01 RX ORDER — SODIUM CHLORIDE 9 MG/ML
INJECTION, SOLUTION INTRAVENOUS CONTINUOUS
Status: DISCONTINUED | OUTPATIENT
Start: 2019-01-01 | End: 2019-01-01

## 2019-01-01 RX ORDER — GUAIFENESIN 100 MG/5ML
10 SOLUTION ORAL EVERY 6 HOURS PRN
Status: ON HOLD | COMMUNITY
End: 2019-01-01

## 2019-01-01 RX ORDER — LIDOCAINE HYDROCHLORIDE 10 MG/ML
10 INJECTION, SOLUTION INFILTRATION; PERINEURAL ONCE
Status: COMPLETED | OUTPATIENT
Start: 2019-01-01 | End: 2019-01-01

## 2019-01-01 RX ORDER — AMLODIPINE BESYLATE 5 MG/1
10 TABLET ORAL
Status: DISCONTINUED | OUTPATIENT
Start: 2019-01-01 | End: 2019-01-01

## 2019-01-01 RX ORDER — QUETIAPINE 25 MG/1
50 TABLET, FILM COATED ORAL EVERY MORNING
Status: DISCONTINUED | OUTPATIENT
Start: 2019-01-01 | End: 2019-01-01

## 2019-01-01 RX ORDER — IPRATROPIUM BROMIDE AND ALBUTEROL SULFATE 2.5; .5 MG/3ML; MG/3ML
3 SOLUTION RESPIRATORY (INHALATION)
Status: DISCONTINUED | OUTPATIENT
Start: 2019-01-01 | End: 2019-01-01

## 2019-01-01 RX ORDER — AMLODIPINE BESYLATE 10 MG/1
10 TABLET ORAL
Qty: 30 TABLET | Refills: 0 | Status: SHIPPED | COMMUNITY
Start: 2019-01-01

## 2019-01-01 RX ORDER — DEXTROSE, SODIUM CHLORIDE, AND POTASSIUM CHLORIDE 5; .45; .075 G/100ML; G/100ML; G/100ML
INJECTION INTRAVENOUS CONTINUOUS
Status: DISCONTINUED | OUTPATIENT
Start: 2019-01-01 | End: 2019-01-01

## 2019-01-01 RX ORDER — GABAPENTIN 300 MG/1
300 CAPSULE ORAL
Status: DISCONTINUED | OUTPATIENT
Start: 2019-01-01 | End: 2019-01-01

## 2019-01-01 RX ORDER — HEPARIN SODIUM 5000 [USP'U]/ML
5000 INJECTION, SOLUTION INTRAVENOUS; SUBCUTANEOUS EVERY 8 HOURS SCHEDULED
Status: DISCONTINUED | OUTPATIENT
Start: 2019-01-01 | End: 2019-01-01

## 2019-01-01 RX ORDER — QUETIAPINE 25 MG/1
25 TABLET, FILM COATED ORAL NIGHTLY
Status: DISCONTINUED | OUTPATIENT
Start: 2019-01-01 | End: 2019-01-01

## 2019-01-01 RX ORDER — GABAPENTIN 300 MG/1
300 CAPSULE ORAL DAILY
Status: DISCONTINUED | OUTPATIENT
Start: 2019-01-01 | End: 2019-01-01

## 2019-01-01 RX ORDER — INSULIN ASPART 100 [IU]/ML
INJECTION, SOLUTION INTRAVENOUS; SUBCUTANEOUS
COMMUNITY

## 2019-01-01 RX ORDER — FUROSEMIDE 20 MG/1
20 TABLET ORAL DAILY
COMMUNITY

## 2019-01-01 RX ORDER — MORPHINE SULFATE 4 MG/ML
0.5 INJECTION, SOLUTION INTRAMUSCULAR; INTRAVENOUS EVERY 4 HOURS PRN
Status: DISCONTINUED | OUTPATIENT
Start: 2019-01-01 | End: 2019-01-01

## 2019-01-01 RX ORDER — HEPARIN SODIUM 5000 [USP'U]/ML
5000 INJECTION, SOLUTION INTRAVENOUS; SUBCUTANEOUS EVERY 12 HOURS SCHEDULED
Status: DISCONTINUED | OUTPATIENT
Start: 2019-01-01 | End: 2019-01-01

## 2019-01-01 RX ORDER — LISINOPRIL 20 MG/1
20 TABLET ORAL DAILY
Status: DISCONTINUED | OUTPATIENT
Start: 2019-01-01 | End: 2019-01-01

## 2019-01-01 RX ORDER — KETOROLAC TROMETHAMINE 15 MG/ML
15 INJECTION, SOLUTION INTRAMUSCULAR; INTRAVENOUS EVERY 6 HOURS PRN
Status: DISCONTINUED | OUTPATIENT
Start: 2019-01-01 | End: 2019-01-01

## 2019-01-01 RX ORDER — DEXTROSE MONOHYDRATE 25 G/50ML
INJECTION, SOLUTION INTRAVENOUS
Status: COMPLETED
Start: 2019-01-01 | End: 2019-01-01

## 2019-01-01 RX ORDER — IPRATROPIUM BROMIDE AND ALBUTEROL SULFATE 2.5; .5 MG/3ML; MG/3ML
3 SOLUTION RESPIRATORY (INHALATION)
COMMUNITY

## 2019-01-01 RX ORDER — DONEPEZIL HYDROCHLORIDE 10 MG/1
20 TABLET, FILM COATED ORAL NIGHTLY
Status: DISCONTINUED | OUTPATIENT
Start: 2019-01-01 | End: 2019-01-01

## 2019-01-01 RX ORDER — METHENAMINE HIPPURATE 1000 MG/1
1 TABLET ORAL DAILY
Status: DISCONTINUED | OUTPATIENT
Start: 2019-01-01 | End: 2019-01-01

## 2019-01-01 RX ORDER — QUETIAPINE 25 MG/1
25 TABLET, FILM COATED ORAL NIGHTLY
COMMUNITY

## 2019-01-01 RX ORDER — LISINOPRIL 20 MG/1
20 TABLET ORAL 2 TIMES DAILY
Status: DISCONTINUED | OUTPATIENT
Start: 2019-01-01 | End: 2019-01-01

## 2019-01-01 RX ORDER — AMLODIPINE BESYLATE 5 MG/1
5 TABLET ORAL
Status: DISCONTINUED | OUTPATIENT
Start: 2019-01-01 | End: 2019-01-01

## 2019-01-01 RX ORDER — ACETAMINOPHEN 500 MG
1000 TABLET ORAL 3 TIMES DAILY
COMMUNITY
Start: 2019-01-01

## 2019-01-01 RX ORDER — ACETAMINOPHEN 500 MG
1000 TABLET ORAL EVERY 6 HOURS PRN
Status: DISCONTINUED | OUTPATIENT
Start: 2019-01-01 | End: 2019-01-01

## 2019-01-01 RX ORDER — QUETIAPINE 25 MG/1
50 TABLET, FILM COATED ORAL NIGHTLY
Status: DISCONTINUED | OUTPATIENT
Start: 2019-01-01 | End: 2019-01-01

## 2019-01-01 RX ORDER — CRANBERRY FRUIT EXTRACT 425 MG
425 CAPSULE ORAL 2 TIMES DAILY
Status: ON HOLD | COMMUNITY
End: 2019-01-01

## 2019-01-01 RX ORDER — ACETAMINOPHEN 500 MG
1000 TABLET ORAL ONCE
Status: COMPLETED | OUTPATIENT
Start: 2019-01-01 | End: 2019-01-01

## 2019-01-01 RX ORDER — QUETIAPINE 50 MG/1
50 TABLET, FILM COATED ORAL EVERY MORNING
Status: ON HOLD | COMMUNITY
End: 2019-01-01

## 2019-01-01 RX ORDER — ENOXAPARIN SODIUM 100 MG/ML
30 INJECTION SUBCUTANEOUS DAILY
Status: DISCONTINUED | OUTPATIENT
Start: 2019-01-01 | End: 2019-01-01

## 2019-01-01 RX ORDER — LISINOPRIL 20 MG/1
20 TABLET ORAL 2 TIMES DAILY
COMMUNITY

## 2019-01-01 RX ORDER — HYDROCODONE BITARTRATE AND ACETAMINOPHEN 5; 325 MG/1; MG/1
1 TABLET ORAL EVERY 6 HOURS PRN
Qty: 20 TABLET | Refills: 0 | Status: SHIPPED | OUTPATIENT
Start: 2019-01-01

## 2019-01-01 RX ORDER — IPRATROPIUM BROMIDE AND ALBUTEROL SULFATE 2.5; .5 MG/3ML; MG/3ML
3 SOLUTION RESPIRATORY (INHALATION) EVERY 4 HOURS PRN
Status: DISCONTINUED | OUTPATIENT
Start: 2019-01-01 | End: 2019-01-01

## 2019-01-01 RX ORDER — PANTOPRAZOLE SODIUM 40 MG/1
40 TABLET, DELAYED RELEASE ORAL
Status: DISCONTINUED | OUTPATIENT
Start: 2019-01-01 | End: 2019-01-01

## 2019-01-01 RX ORDER — GABAPENTIN 300 MG/1
300 CAPSULE ORAL DAILY
COMMUNITY

## 2019-01-01 RX ORDER — FUROSEMIDE 40 MG/1
20 TABLET ORAL DAILY
Qty: 90 TABLET | Refills: 3 | Status: ON HOLD | COMMUNITY
Start: 2019-01-01 | End: 2019-01-01

## 2019-01-01 RX ORDER — LIDOCAINE HYDROCHLORIDE 10 MG/ML
INJECTION, SOLUTION INFILTRATION; PERINEURAL
Status: COMPLETED
Start: 2019-01-01 | End: 2019-01-01

## 2019-01-01 RX ORDER — ASCORBIC ACID 500 MG
500 TABLET ORAL DAILY
COMMUNITY

## 2019-01-01 RX ORDER — QUETIAPINE 50 MG/1
50 TABLET, FILM COATED ORAL NIGHTLY
COMMUNITY

## 2019-01-01 RX ORDER — CEPHALEXIN 500 MG/1
500 CAPSULE ORAL 3 TIMES DAILY
Qty: 30 CAPSULE | Refills: 0 | Status: SHIPPED | OUTPATIENT
Start: 2019-01-01 | End: 2019-01-01

## 2019-01-01 RX ORDER — HYDRALAZINE HYDROCHLORIDE 20 MG/ML
20 INJECTION INTRAMUSCULAR; INTRAVENOUS EVERY 6 HOURS PRN
Status: DISCONTINUED | OUTPATIENT
Start: 2019-01-01 | End: 2019-01-01

## 2019-01-01 RX ORDER — DEXTROSE MONOHYDRATE 25 G/50ML
50 INJECTION, SOLUTION INTRAVENOUS
Status: DISCONTINUED | OUTPATIENT
Start: 2019-01-01 | End: 2019-01-01

## 2019-01-01 RX ORDER — ZINC OXIDE AND DIMETHICONE 120; 10 MG/G; MG/G
CREAM TOPICAL
Status: ON HOLD | COMMUNITY
End: 2019-01-01

## 2019-01-29 NOTE — ED INITIAL ASSESSMENT (HPI)
Per EMS pt is from [facility], pt was found face down in the dinning room. Pt does not remember falling. Pt presences in C collar and and laceration to head and left arm.

## 2019-01-29 NOTE — ED PROVIDER NOTES
Patient Seen in: [facility] Emergency Department    History   Patient presents with:  Fall (musculoskeletal, neurologic)  Trauma 1 & 2 (cardiovascular, musculoskeletal)    Stated Complaint: pt unwitnesed fall, bs was 306    HPI    This is a 69-year- SURGICAL HISTORY     • REPAIR ROTATOR CUFF,ACUTE      right shoulder   • TOTAL ABDOM HYSTERECTOMY     • TOTAL KNEE REPLACEMENT             Social History    Tobacco Use      Smoking status: Never Smoker      Smokeless tobacco: Never Used    Alcohol use: No fibula. Small suprapatellar effusion. Vascular calcifications in the soft tissues. Dictated by: Tammy Garces MD on 1/29/2019 at 18:05     Approved by: Tammy Garces MD            Ct Brain Or Head ([id number])    Result Date: 1/29/2019  CONCLUSION:  1.  Tyrell Bradley cleared. There is no other obvious injuries. Her minor skin tears on the dorsum of both hands were clamped cleansed with bacitracin applied. She does have significant facial swelling from the fall. A cold compress was applied topically.   Dressings were

## 2019-01-30 NOTE — ED NOTES
RN spoke with RN at [facility] to notify them that pt will be returning back

## 2019-01-30 NOTE — ED NOTES
[address] contacted for transport back to [facility]. Eta is greater than 2 hours. Asked to try another service.

## 2019-03-12 PROBLEM — N39.0 SEPSIS DUE TO URINARY TRACT INFECTION (HCC): Status: ACTIVE | Noted: 2019-01-01

## 2019-03-12 PROBLEM — A41.9 SEPSIS DUE TO URINARY TRACT INFECTION (HCC): Status: ACTIVE | Noted: 2019-01-01

## 2019-03-12 NOTE — ED INITIAL ASSESSMENT (HPI)
FEVER YESTERDAY. DAUGHTER CONCERNED FOR STROKE D/T L ARM WEAKNESS. BP 94/. BLOOD CX. UA. TODAY. BUN 67, CREAT 2.49, ALB 2.5. MAIER CATH CAME OUT DURING CHANGE. PT W/ HX OF RETENTION. FECES NOTED FROM URETHRA. EXCORIATION PRESENT. ? FISTULA/.  UNABLE TO REPL

## 2019-03-12 NOTE — ED PROVIDER NOTES
Patient Seen in: [facility] Emergency Department    History   Patient presents with:  Dehydration (metabolic/constitutional)  Cath Tube Problem (gastrointestinal, urinary, integumentary)    Stated Complaint: dehydration    HPI    76-year-old female Vision)   • ANGIOPLASTY (CORONARY)  10/29/07    100% diagonal, unable to wire   • APPENDECTOMY     • BACK SURGERY     • CATH DRUG ELUTING STENT     • CHOLECYSTECTOMY     • HYSTERECTOMY     • KNEE REPLACEMENT SURGERY Right    • OTHER SURGICAL HISTORY     • (*)     GFR, -American 17 (*)     AST 13 (*)     ALT 9 (*)     Total Protein 6.2 (*)     Albumin 1.8 (*)     A/G Ratio 0.4 (*)     All other components within normal limits   LACTIC ACID, PLASMA - Abnormal; Notable for the following components:    L Technologist)  Patient provides no history at this time. FINDINGS: Stable mild to moderate global brain parenchymal volume loss without overt hydrocephalus. There is no midline shift or mass-effect. The basal cisterns are patent.   The gray-white matter unremarkable ADRENALS:  Mild nonspecific bilateral adrenal thickening is unchanged KIDNEYS:  No urinary calculi or obstructive uropathy. Vascular calcifications along both kidneys.  AORTA/VASCULAR:  Mild to moderate scattered calcified plaque RETROPERITONE limits of normal.  There is evidence of central chronic bronchial wall thickening. CONCLUSION:  There is mild increased parenchymal density and interstitial increased density in the presence of dehydration.   Consider repeat radiograph after hydration Unknown        Critical Care Note:  The patient arrived with a condition with significant morbidity and mortality associated.  The services I provided  were to promote improvement and reduce mortality specifically involving complex record review, complex me

## 2019-03-13 NOTE — DIETARY NOTE
[address]     Admitting diagnosis:  Sepsis due to urinary tract infection ([facility] [address] [A41.9, N39.0]    Ht: 162.6 cm (5' 4.02\")  Wt: 81.6 kg (180 lb). This is 144% of IBW  BMI: Body mass index is 30.88 kg/m².   IBW:

## 2019-03-13 NOTE — H&P
[address] Patient Status:  Inpatient    11/10/1940 MRN [id number]   [facility] 5NW-A Attending Andrea Domingo MD   [facility] Day # 1 PCP Paramjit Cardona MD     History of Present Illness:  Violetta Oppenheim SURGERY     • CATH DRUG ELUTING STENT     • CHOLECYSTECTOMY     • HYSTERECTOMY     • KNEE REPLACEMENT SURGERY Right    • OTHER SURGICAL HISTORY     • REPAIR ROTATOR CUFF,ACUTE      right shoulder   • TOTAL ABDOM HYSTERECTOMY     • TOTAL KNEE REPLACEMENT 251-300=30 units 301-350=31 units 351-400=32 units 401-450=33 units 70>BS>450 call MD Disp: 10 mL Rfl: 2    NYSTOP 718623 UNIT/GM External Powder APPLY POWDER TOPICALLY TWICE DAILY ON AFFECTED AREA (Patient taking differently: apply under right breast ever Chloride ER 10 MEQ Oral Tab CR Take 1 tablet (10 mEq total) by mouth 2 (two) times daily. Disp: 180 tablet Rfl: 0    Methenamine Hippurate 1 g Oral Tab Take 1 tablet (1 g total) by mouth daily.  Disp: 90 tablet Rfl: 0    QUETIAPINE FUMARATE 50 MG Oral Tab T HCT 24.6 03/13/2019    .0 03/13/2019    CREATSERUM 2.55 03/13/2019    BUN 67 03/13/2019     03/13/2019    K 4.5 03/13/2019     03/13/2019    CO2 23.0 03/13/2019     03/13/2019    CA 7.8 03/13/2019    ALB 1.8 03/13/2019    ALKPH Abdomen+pelvis(cpt=74176)    Result Date: 3/12/2019  PROCEDURE:  CT ABDOMEN+PELVIS (CPT=74176)  COMPARISON:  SHASHI , CT ABDOMEN+PELVIS(CPT=74176), 10/29/2018, 1:57.   INDICATIONS:  dehydration  TECHNIQUE:  Unenhanced multislice CT scanning was performed fr the urinary bladder could be related to cystitis. Clinical correlation recommended. 2. No urinary calculi or obstructive uropathy. Please see above for further details.     Dictated by: Collins Gonzalez MD on 3/12/2019 at 17:05     Approved by: Leopold Kehr Hypoglycemia     Syncope     Pyuria     Syncope and collapse     Congestive heart failure (HCC)     Hypervolemia     Hematuria     Renal insufficiency     Memory deficits     Dementia     Gait disorder     B12 deficiency     Pyelonephritis     Acute urinar

## 2019-03-13 NOTE — CM/SW NOTE
Pt is a long-term resident of [facility]. Pt has been there since Dec of 2018.   Confirmed with pt's dtr that pt will return to [facility] upon d/c.  SW following.       03/13/19 1500   CM/SW Referral Data   Referral Source Physician   Reason for Refer

## 2019-03-13 NOTE — PHYSICAL THERAPY NOTE
PHYSICAL THERAPY QUICK EVALUATION - INPATIENT    Room Number: 507/507-A  Evaluation Date: 3/13/2019  Presenting Problem: sepsis 2/2 UTI  Physician Order: PT Eval and Treat    Problem List  Principal Problem:    Sepsis due to urinary tract infection ([facility] has memory deficits. Writer attempted to call x3 this date. First attempt staff in meeting, 2nd and 3rd attempts writer was hung up on. Prior to transfer to LTC, pt was transferring with 1 assist.      SUBJECTIVE  \"hmmm. I don't know? \"    OBJECTIVE session/findings; All patient questions and concerns addressed    ASSESSMENT   Patient is a 77year old female admitted on 3/12/2019 for UTI. Pertinent comorbidities and personal factors impacting therapy include h/o memory deficits, w/c bound.   Functional

## 2019-03-13 NOTE — PROGRESS NOTES
03/12/19 2034   Provider Notification   Reason for Communication Review case   Provider Name Kyle Alva MD   Method of Communication Call   Response Phone call   Notification Time 2034       MD called for admission orders.  Per MD she will place orde

## 2019-03-13 NOTE — PLAN OF CARE
CARDIOVASCULAR - ADULT    • Maintains optimal cardiac output and hemodynamic stability Progressing        GENITOURINARY - ADULT    • Absence of urinary retention Progressing        METABOLIC/FLUID AND ELECTROLYTES - ADULT    • Glucose maintained within pre Hematuria     Renal insufficiency     Memory deficits     Dementia     Gait disorder     B12 deficiency     Pyelonephritis     Acute urinary retention     Urinary tract infection without hematuria     Urinary tract infection without hematuria, site unspe

## 2019-03-13 NOTE — OCCUPATIONAL THERAPY NOTE
OCCUPATIONAL THERAPY QUICK EVALUATION - INPATIENT    Room Number: 404/650-S  Evaluation Date: 3/13/2019     Type of Evaluation: Initial  Presenting Problem: sepsis    Physician Order: IP Consult to Occupational Therapy  Reason for Therapy:  ADL/IADL Dysfun SITUATION  Type of Home: Skilled nursing facility(LTC resident)     Lives With: Staff 24 hours                     Drives: No       Prior Level of Function: Pt lives in [facility] as [address] [facility] resident, pt has assist for 24 hours a day and is typically W/ Patient is a 77year old female admitted on 3/12/2019 for UTI. Complete medical history and occupational profile noted above. Functional outcome measures completed include AMPAC, MMT, ROM.      Pt appears to be at or near baseline and recommendation t/f

## 2019-03-13 NOTE — PROGRESS NOTES
NURSING ADMISSION NOTE      Patient admitted via Cart  Oriented to room 507. Safety precautions initiated. Bed in low position. Call light in reach. Admission completed with patient daughter and nursing home papers at bedside.  MD Lary Samuels paged for

## 2019-03-14 NOTE — CDS QUERY
Clarification – Potential Diagnosis [place] [facility]  Dear Dr. Lobito Toscano:  Clinical information in the patient's medical record (provided below) includes documentation of diagnoses with potential association.  For accurate

## 2019-03-14 NOTE — PLAN OF CARE
Impaired Activities of Daily Living    • Achieve highest/safest level of independence in self care Adequate for Discharge        Impaired Functional Mobility    • Achieve highest/safest level of mobility/gait Adequate for Discharge          CARDIOVASCULAR

## 2019-03-14 NOTE — PLAN OF CARE
CARDIOVASCULAR - ADULT    • Maintains optimal cardiac output and hemodynamic stability Progressing        GENITOURINARY - ADULT    • Absence of urinary retention Progressing        METABOLIC/FLUID AND ELECTROLYTES - ADULT    • Hemodynamic stability and opt

## 2019-03-14 NOTE — CONSULTS
[facility] Urology   Consultation Note    Dorothea Royal Patient Status:  Inpatient    11/10/1940 MRN [id number]   [facility] 5NW-A Attending Bela Ronquillo MD   Hosp Day # 2 PCP Gustavo Burnett MD     Reason for Consultation:  Chr 10/8/2014    chronic mooney catheter since 2011   • Urinary retention      Past Surgical History:   Procedure Laterality Date   • ANGIOGRAM     • ANGIOPLASTY (CORONARY)  10/24/07    90% LADp (3.0x16mm Taxus), 90% D1 (2.5x12mm Vision)   • ANGIOPLASTY (CORONA 4-20 Units, 4-20 Units, Subcutaneous, TID CC and HS    Review of Systems:  Pertinent items are noted in HPI.     Physical Exam:  BP (!) 168/66 (BP Location: Right arm)   Pulse 61   Temp 98.5 °F (36.9 °C) (Oral)   Resp 20   Ht 5' 4.02\" (1.626 m)   Wt 186 lb calculi or obstructive uropathy. Vascular calcifications along both kidneys. AORTA/VASCULAR:  Mild to moderate scattered calcified plaque  RETROPERITONEUM:  Unremarkable. BOWEL/MESENTERY:  Moderate feces in the colon.   No large or small bowel dilatation Dyslipidemia     Azotemia     Metabolic alkalosis     Respiratory acidosis     Hypoglycemic reaction     Acute cystitis without hematuria     Bradycardia     Uncontrolled type 2 diabetes mellitus with other circulatory complication, with long-term current

## 2019-03-15 NOTE — PROGRESS NOTES
Dr Ehsan Unger paged re blood sugar of 35. Dextrose given. Blood sugar went up to 145. Will follow protocol. Dr Ehsan Unger aware of urine leaking around mooney. Cleaned pt and will continue to monitor.

## 2019-03-15 NOTE — PLAN OF CARE
CARDIOVASCULAR - ADULT    • Maintains optimal cardiac output and hemodynamic stability Progressing        GENITOURINARY - ADULT    • Absence of urinary retention Progressing        Patient/Family Goals    • Patient/Family Long Term Goal Progressing    • Pa long-term current use of insulin ([facility]     Uncontrolled type 2 diabetes mellitus with stage 3 chronic kidney disease, with long-term current use of insulin (HCC)     Long-term insulin use (HCC)     S/P coronary artery stent placement     Type 2 diabetes, u

## 2019-03-15 NOTE — PROGRESS NOTES
03/15/19 0534   Provider Notification   Reason for Communication Review case   Test/Procedure Name blood pressure   Provider Name Madyson Mcgill MD   Method of Communication Call   Response See orders   Notification Time [phone]   Dr Benavides Po notified of Shira Aguayo

## 2019-03-15 NOTE — CM/SW NOTE
Anticipate pt will return to [facility] at [facility]. Updates sent via [facility] [address]. QD done. / to remain available for support and/or discharge planning.      [facility]  R:[phone]  F:[phone]

## 2019-03-16 NOTE — PROGRESS NOTES
[facility]  Progress Note    Carita Hatchet Patient Status:  Inpatient    11/10/1940 MRN [id number]   [facility] 5NW-A Attending Alex Cheng MD   Hosp Day # 4 PCP Farhana Watson MD       SUBJECTIVE:  Pleasantly confused, afebrile  H focal neurologic deficits  Pulm: Lungs clear, normal respiratory effort  CV: Heart with regular rate and rhythm, no peripheral edema  Abd: Abdomen soft, nontender, nondistended, no organomegaly, bowel sounds present  Urbina with clear urine  MSK: Full range artery disease)     Urinary retention     Lethargy     Morbid obesity ([facility] [address]     At risk for falling     Chronic systolic congestive heart failure (HCC)     Hypertension, essential, benign     Hypoglycemia     Syncope     Pyuria     Syncope and collapse

## 2019-03-16 NOTE — PROGRESS NOTES
03/15/19 2017   Provider Notification   Reason for Communication Change in status   Provider Name Madyson Mcgill MD   Method of Communication Call   Response Waiting for response   Notification Time 2017     Pt bp 176/67, MD called, waiting for response

## 2019-03-16 NOTE — PROGRESS NOTES
03/15/19 2037   Provider Notification   Reason for Communication Review case   Provider Name Jorge Sharma MD   Method of Communication Call   Response See orders  (see MAR )   Notification Time 2037     MD ordered to change lisinopril to BID

## 2019-03-16 NOTE — PROGRESS NOTES
[facility]  Progress Note    Dorothea Royal Patient Status:  Inpatient    11/10/1940 MRN [id number]   [facility] 5NW-A Attending Bela Ronquillo MD   [facility] Day # 4 PCP Gustavo Burnett MD   Late entry DOS 3/15/2019    SUBJECTIVE:  Pleasant neurologic deficits  Pulm: Lungs clear, normal respiratory effort  CV: Heart with regular rate and rhythm, no peripheral edema  Abd: Abdomen soft, nontender, nondistended, no organomegaly, bowel sounds present  Urbina with clear urine  MSK: Full range of mo Chronic systolic congestive heart failure (HCC)     Hypertension, essential, benign     Hypoglycemia     Syncope     Pyuria     Syncope and collapse     Congestive heart failure ([address]     Hypervolemia     Hematuria     Renal insufficiency     Memory defici

## 2019-03-17 NOTE — CM/SW NOTE
ALEE spoke to Mingo Champagne of [facility] to confirm discharge. ALEE arranged for [employer] to transport at [facility]. RN to call [facility] N:[phone] with report.     SW complete the ambulance form and sent 2 copies to the

## 2019-03-17 NOTE — DISCHARGE SUMMARY
[facility]  Discharge Summary    Leigh Ann Hernandez Patient Status:  Inpatient    11/10/1940 MRN [id number]   [facility] 5NW-A Attending Lawanda Ware MD   [facility] Day # 5 PCP Sallie Royal MD     Date of Admission: 3/12/2019    Date of Dis injury ([facility]     Metabolic acidosis     Hyperglycemia     Sepsis ([facility] [address]     Sepsis, due to unspecified organism ([facility] [address]     Diarrhea, unspecified type     Hypotension, unspecified hypotension type     Edema of both legs     Sepsis due to urinary tract infecti total) by mouth daily. Qty: 90 tablet Refills: 3    Skin Protectants, Misc. (LESVIA PROTECT) External Cream  Apply topically Q shift. Apply to buttock every shift    Cranberry 425 MG Oral Cap  Take 425 mg by mouth 2 (two) times daily.     cyanocobalamin 1000 1    aspirin 325 MG Oral Tab  Take 81 mg by mouth daily.         STOP taking these medications    guaiFENesin 100 MG/5ML Oral Solution    nystatin 612242 UNIT/GM External Cream    simvastatin 20 MG Oral Tab    insulin aspart 100 UNIT/ML Subcutaneous Solutio

## 2019-03-17 NOTE — PROGRESS NOTES
NURSING DISCHARGE NOTE    Discharged Nursing home via Ambulance. Accompanied by Support staff  Belongings Taken by patient/family. Pt is assessed and ready for discharge. Instructions and follow up gone over with RN at [facility].  IV dc'

## 2019-03-17 NOTE — PLAN OF CARE
CARDIOVASCULAR - ADULT    • Maintains optimal cardiac output and hemodynamic stability Adequate for Discharge        GENITOURINARY - ADULT    • Absence of urinary retention Adequate for Discharge        Impaired Activities of Daily Living    • Achieve high

## 2019-03-18 NOTE — CM/SW NOTE
03/18/19 0900   Discharge disposition   Expected discharge disposition Skilled Nurs   Name of Facillity/Home Care/Hospice ([facility] (LTC))   [facility]charge [facility]     Patient discharged on 03/17/2019 as previously planned.

## 2019-11-19 PROBLEM — S32.402A CLOSED NONDISPLACED FRACTURE OF LEFT ACETABULUM, UNSPECIFIED PORTION OF ACETABULUM, INITIAL ENCOUNTER (HCC): Status: ACTIVE | Noted: 2019-01-01

## 2019-11-20 PROBLEM — Z71.89 GOALS OF CARE, COUNSELING/DISCUSSION: Status: ACTIVE | Noted: 2019-01-01

## 2019-11-20 PROBLEM — M25.559 HIP PAIN: Status: ACTIVE | Noted: 2019-01-01

## 2019-11-20 NOTE — ED PROVIDER NOTES
Patient Seen in: [facility] Emergency Department      History   Patient presents with:  Trauma    Stated Complaint:     HPI    This is a pleasant 25-year-old female presenting with hip pain.   Patient's pain complaint of left hip pain through the wee • TOTAL ABDOM HYSTERECTOMY     • TOTAL KNEE REPLACEMENT                      Social History    Tobacco Use      Smoking status: Never Smoker      Smokeless tobacco: Never Used    Alcohol use: No      Alcohol/week: 0.0 standard drinks    Drug use:  No [facility])    Disposition:  Admit  11/19/2019  8:55 pm    Follow-up:  No follow-up provider specified.       Medications Prescribed:  Current Discharge Medication List                   Present on Admission  Date Reviewed: 5/30/2018          ICD-10-CM Noted POA

## 2019-11-20 NOTE — CONSULTS
[facility]  [id number]  Hospital Day #1  Date of Consult: 11/20/19  Patient seen at: [facility] Room 361    Reason for Consultation:      Consult requested by Dr Ignacia Cardoso for evaluation of COPD    • CORONARY ARTERY DISEASE 2007    ALEKS to LAD, BMS to diagonal   • Coronary atherosclerosis    • Dementia ([facility] [address]    • Diabetes ([facility]    • Esophageal reflux    • Essential hypertension    • High blood pressure    • High cholesterol    • History of blo Current Facility-Administered Medications:   •  acetaminophen (TYLENOL EXTRA STRENGTH) tab 1,000 mg, 1,000 mg, Oral, Q6H PRN  •  amLODIPine Besylate (NORVASC) tab 10 mg, 10 mg, Oral, Daily  •  insulin detemir (LEVEMIR) 100 UNIT/ML flextouch 15 Units, 11/19/2019    ALB 1.8 (L) 11/19/2019    ALKPHO 104 11/19/2019    BILT 0.1 11/19/2019    TP 6.3 (L) 11/19/2019    AST 15 11/19/2019    ALT 7 (L) 11/19/2019    DDIMER 0.93 (H) 12/23/2016    MG 2.1 10/28/2018    TROP <0.046 10/29/2018       Imaging:  Xr Femur joint space narrowing both hips. Lucency involving the proximal left femoral diaphysis. Please see separate dictation of left femur.    Dictated by: Joseph Trujillo MD on 11/20/2019 at 17:02     Approved by: Joseph Trujillo MD on 11/20/2019 at 17:05 Minimal Drowsy/confused   10 Bedbnd/coma Extensive Disease  Can’t do any work  coma  Max Assist  Total Care Mouth care Drowsy or coma   0 Death     Palliative Care Assessment     Goals of Care: I introduced palliative care and reason for consultation.  I pr movement to see if this lessens her pain.     Will give IV Toradol for moderate pain prn (30 minutes prior to any movement if possible)  Will give IV Morphine for severe pain prn (30 minutes prior to any movement if possible)  Continue the ordered Acetamino Symptoms management:  Agree with Acetaminophen prn for mild pain  Start Toradol 15 mg IV Q 6 hour prn for moderate pain  Start Morphine 0.5 mg IV Q 4 hours for severe pain  Consider giving above medications approximately 30 minutes prior to any movement

## 2019-11-20 NOTE — CONSULTS
[facility] Consult Note    Reason for Consult: Lytic Lesions   Medical Record Number: [id number]   CSN: [id number]   Referring Physician: No ref.  provider found  PCP: Annamarie Peters MD    History of Present Illness: [de-identified] with a PMH of Opal Segura (DEX-4) chewable tab 4 tablet, 4 tablet, Oral, Q15 Min PRN **OR** dextrose 50 % injection 50 mL, 50 mL, Intravenous, Q15 Min PRN **OR** glucose (DEX4) oral liquid 30 g, 30 g, Oral, Q15 Min PRN **OR** Glucose-Vitamin C (DEX-4) chewable tab 8 tablet, 8 table on file    Occupational History      Occupation: retired     Tobacco Use      Smoking status: Never Smoker      Smokeless tobacco: Never Used    Substance and Sexual Activity      Alcohol use: No        Alcohol/week: 0.0 standard drinks      D palliative RT  - Will change Lovenox to Heparin SC due to renal function. Patient will need Hep SC ppx given immobility     [facility]

## 2019-11-20 NOTE — H&P
[address] Patient Status:  Inpatient    11/10/1940 MRN [id number]   [facility] 3SW-A Attending Mi Angeles MD   [facility] Day # 1 PCP Boy Johnson MD     History of Present Illness:  Khushboo Marley • CATH DRUG ELUTING STENT     • CHOLECYSTECTOMY     • HYSTERECTOMY     • KNEE REPLACEMENT SURGERY Right    • OTHER SURGICAL HISTORY     • REPAIR ROTATOR CUFF,ACUTE      right shoulder   • TOTAL ABDOM HYSTERECTOMY     • TOTAL KNEE REPLACEMENT       Family mouth every morning., Disp: , Rfl:   Probiotic Product [facility] OR), Take 1 capsule by mouth 2 (two) times daily. For 21 days , Disp: , Rfl: , Taking  amLODIPine Besylate 10 MG Oral Tab, Take 1 tablet (10 mg total) by mouth once daily. , Disp: 30 tablet, demented  Musculoskeletal: Full range of motion of all extremities. No swelling noted. Integument: No lesions. except bruise  Psychiatric: Appropriate mood and affect.     Laboratory Data:   Lab Results   Component Value Date    WBC 10.2 11/19/2019    HGB & Plan:     Acute fracture involving the left acetabulum- likely pathological Fracture  Multiple lytic lesions- ? MM , ?  Metastatic   Advanced dementia, WC bound, Hoyerlift  D/W POA- plan is to send her back tomorrow , considering Hospice  At [facility] [place]  Patient A nondisplaced fracture of left acetabulum, unspecified portion of acetabulum, initial encounter ([facility]          Rhys Bill  11/20/2019  3:14 PM

## 2019-11-20 NOTE — PLAN OF CARE
Alert to self only. Denies any pain. Skin check done upon arrival. Mepilex on sacrum changed. Picture taken of sore and placed in chart. VSS. On RA. Takes pills crushed in apple sauce. Voids in brief. Will continue to monitor.

## 2019-11-20 NOTE — CONSULTS
ORTHO CONSULT NOTE    Patient Identification:        Name: Clare Fleming  Age: [de-identified]year old  Sex: female  :  11/10/1940  MRN: [id number]                                              Requesting Physician: Dr. Britt Medina      HPI:        Clare Fleming Uncontrolled type 2 diabetes mellitus with hyperglycemia, with long-term current use of insulin ([facility]     Uncontrolled type 2 diabetes mellitus with stage 3 chronic kidney disease, with long-term current use of insulin (HCC)     Long-term insulin use ([facility] [facility] 100% diagonal, unable to wire   • APPENDECTOMY     • BACK SURGERY     • CATH DRUG ELUTING STENT     • CHOLECYSTECTOMY     • HYSTERECTOMY     • KNEE REPLACEMENT SURGERY Right    • OTHER SURGICAL HISTORY     • REPAIR ROTATOR CUFF,ACUTE      right shoulder , Disp: , Rfl: , Taking  Montelukast Sodium 10 MG Oral Tab, Take 1 tablet (10 mg total) by mouth once daily. , Disp: 90 tablet, Rfl: 1, Taking  gabapentin 300 MG Oral Cap, Take 1 capsule (300 mg total) by mouth 3 (three) times daily with meals. , Disp: 270 flextouch 15 Units, 15 Units, Subcutaneous, Q12H  lisinopril tab 20 mg, 20 mg, Oral, Daily  QUEtiapine Fumarate (SEROQUEL) tab 50 mg, 50 mg, Oral, Nightly  Sertraline HCl (ZOLOFT) tab 50 mg, 50 mg, Oral, Nightly  Enoxaparin Sodium (LOVENOX) 30 MG/0.3ML inj obvious deformity, erythema, or ecchymosis  Tender to palpation over pelvis   Able to dorsiflex ankle and move toes  Pain with any IR/ER of hip  Unable to test ROM due to pain  Sensation grossly intact to light touch throughout  Distal pulses intact  No ca immobility/bed bound. This fracture will plan to be treated conservatively at this time. Discussed that patient should be TTWB with walker on that side and given limited mobility would be for transfers only. OK to sit up or be up in chair.  Will need to status. Questions answered.

## 2019-11-20 NOTE — ED INITIAL ASSESSMENT (HPI)
PT from [facility], woke up with L sided hip pain. Denies any falls/trauma.  Xray completed, and shows L acetabular fracture

## 2019-11-20 NOTE — DIETARY NOTE
[address]     Admitting diagnosis:  Hypoglycemia [E16.2]  Closed nondisplaced fracture of left acetabulum, unspecified portion of acetabulum, initial encounter ([facility] [S32402A]    Ht: 162.6 cm (5' 4\")  Wt: 84.

## 2019-11-20 NOTE — PROGRESS NOTES
[facility] Note: Renal dose adjustment for Enoxaparin (Lovenox)  Juan Granados has been prescribed Enoxaparin (Lovenox)  40 mg subcutaneously every 24 hours. Estimated Creatinine Clearance: 24.2 mL/min (A) (based on SCr of 1.6 mg/dL (H)).     Her ca

## 2019-11-20 NOTE — PHYSICAL THERAPY NOTE
PHYSICAL THERAPY QUICK EVALUATION - INPATIENT    Room Number: 361/361-A  Evaluation Date: 11/20/2019  Presenting Problem: hip pain/hypoglycemia   Physician Order: PT Eval and Treat    Problem List  Principal Problem:    Hypoglycemia  Active Problems: Prior Level of [place]: unable to obtain from patient PLOF as patient only orientated to self. Per chart from ortho MD patient is from SNF w/c bound/bed bound and non-ambulatory.      SUBJECTIVE  \"I like pancakes\"    OBJ start of session, per RN cleared for therapy session, patient agreeable to therapy. Patient educated on TTWB L LE at start of session.  Patient A & O to self only and knows she is in hospital. Patient performed supine to sit max AX2, increase time/effort ne

## 2019-11-20 NOTE — PLAN OF CARE
Dr. Kristie Carson paged regarding wanting 24 hour urine collection for Bence Bill test, pt is incontinent, asked if mooney is wanted. Awaiting response.

## 2019-11-20 NOTE — PLAN OF CARE
Pt in bed. Ortho saw this am, non-surgical. Usman Bates for TTWB for transfers to chair. PT attempted to see, was total assistance to sit at side of bed with pain to left hip.  Daughter at bedside this am, states that pt is at baseline mentation and has declined ove

## 2019-11-20 NOTE — CM/SW NOTE
11/20/19 1300   CM/SW Referral Data   Referral Source Social Work (self-referral)   Reason for Referral Discharge planning   Informant Children   Patient Info   Patient's Mental Status Alert;Confused   Patient's [facility] SN[facility]   Car

## 2019-11-21 NOTE — PROGRESS NOTES
[facility] Progress Note   Length of Stay: 2    Subjective: NAEO. Started on toradol and morphine. Pain appears to be improving. Plan to transition to comfort care.      ROS: 12 Point ROS completed and pertinent positives are above     O neuropathy and heart disease the family would like to pursue comfort measures.  For the time being we have a Monoclonal protein study pending.   - Appreciated palliative care consult  - Monoclonal protein study pending  - Discussed case with Radiation Oncol

## 2019-11-21 NOTE — PROGRESS NOTES
[id number] [facility] 149 Follow Up    [facility] Cassandra  [id number]  Hospital Day #2  Date of Consult: 11/20/19  Patient seen at: [facility] Room 361    Subjective:      Patient was seen and examined with no one else at the bedside.    Denis childs PRN  •  morphINE sulfate (PF) 4 MG/ML injection 0.52 mg, 0.52 mg, Intravenous, Q4H PRN  •  glucose (DEX4) oral liquid 15 g, 15 g, Oral, Q15 Min PRN **OR** Glucose-Vitamin C (DEX-4) chewable tab 4 tablet, 4 tablet, Oral, Q15 Min PRN **OR** dextrose 50 % inj lesions proximally. The differential would include metastatic disease versus multiple myeloma. There is an acute vertical fracture involving the left acetabulum, inferior medial left iliac rim and extending towards the inferior left pubic ramus.   Large s Reduced Some disease  Can’t perform job Full Normal or   Reduced Full   60 Reduced Significant disease  Can’t perform hobby Occasional  Assist Normal or   Reduced Full or confused   50 Mainly sit/lie Extensive Disease  Can’t do any work   Partial Assist No POA / HC surrogate: her daughter Yeni Grayson    3.  Symptoms management: will continue Toradol IV and Morphine IV as ordered prn for pain on scale; if transfer to SNF today then Norco at lowest dose of 5/325 one tablet po Q 6 hours prn for pain, Rx printed a

## 2019-11-21 NOTE — CM/SW NOTE
Per RN Flora Gaona, pt is cleared for discharge today. Planning for possible outpatient Palliative radiation treatment. I contacte pts daughter Stan Hooker who agrees with plan for discharge today. Will request 4pm  time, BLS from [facility].  PCS form placed on

## 2019-11-21 NOTE — PLAN OF CARE
Report called to [facility]. Hospice consult will be done at [place] [id number]. Toradol given for pain prior for dc, patient transferred via ambulance, patient sent with script for norco in transfer paperwork.

## 2019-11-21 NOTE — PLAN OF CARE
Pt continues to be drowsy. Responds to voice, and drifts off to sleep right away. Daughter at bedside visiting. Refused to eat dinner and to take PO intake. VSS. Requires max assist to turn. Waffle cushion in place. SCDs on. Will continue to monitor.

## 2019-11-21 NOTE — PROGRESS NOTES
Progress Note    Patient: Elvis [address] record #: [id number]    Kita Garcia is a [de-identified]year old  female with left acetabular fracture and newly found lytic lesions in femur. The patient is comfortable sitting up in bed.  Patients daughter an Edema of both legs     Sepsis due to urinary tract infection (HCC)     Closed nondisplaced fracture of left acetabulum, unspecified portion of acetabulum, initial encounter ([facility] [address]     Lytic bone lesion of hip     Goals of care, counseling/discussion     Hip extremity:  No obvious deformity, erythema, or ecchymosis  Tender to palpation over pelvis   Able to dorsiflex ankle and move toes  Pain with any IR/ER of hip  Unable to test ROM due to pain  Sensation grossly intact to light touch throughout  Distal pulse

## 2019-11-21 NOTE — CDS QUERY
Impaired Skin Integrity  CLINICAL DOCUMENTATION CLARIFICATION FORM  Dear Doctor:  Clinical information (provided below) includes documentation indicating impaired skin integrity.  For accurate ICD-10-CM code assignment to reflect severity of illness and ris

## 2019-11-21 NOTE — DISCHARGE SUMMARY
[facility]  Discharge Summary    Antoni Hebert Patient Status:  Inpatient    11/10/1940 MRN [id number]   [facility] 3SW-A Attending Singh Bruce MD   [address] Day # 2 PCP Fidel Shields MD     Date of Admission: 2019    Date of Steffi Sweeney ([facility] [address]     S/P coronary artery stent placement     Type 2 diabetes, uncontrolled, with neuropathy ([address]     Acute kidney injury ([address]     Metabolic acidosis     Hyperglycemia     Sepsis ([address]     Sepsis, due to unspecified organism     Diarrhea, unspecified than 350.    !! QUEtiapine Fumarate 25 MG Oral Tab  Take 25 mg by mouth nightly. !! QUEtiapine Fumarate 50 MG Oral Tab  Take 50 mg by mouth nightly. Probiotic Product [facility] OR)  Take 1 capsule by mouth 2 (two) times daily.  For 21 days     amLODI

## 2019-11-22 NOTE — CM/SW NOTE
11/22/19 0800   Discharge disposition   Expected discharge disposition Skilled Nurs   Name of Facillity/Home Care/Hospice [address]   Discharge transportation [facility] - Remote Progress Note  REMOTE LAB    Indication: On-X Mechanical valve #25 and a 30mm hemashield; stroke  Referring Provider: Alvaro (Last seen: 7/10/19  next appt: 1/15/20)  Initial Warfarin Start Date:  ~9/2018  Goal INR: 2.0-3.0 per referral  Current Drug Interactions: duloxetine, lansoprazole, levothyroxine, Trintellix, valproic acid  Bleed Risk: ascending aortic aneurysm   Other: Hodgkins Lymphoma (hx of chemo and radiation); h/o CVA + hemorrhagic pancreatitis (during hospitalization for mAVR)    Diet: avoids GLV 10/29/19  Alcohol: socially  Tobacco: none  OTC Pain Medication: APAP PRN pain    INR History:  Date 9/27 9/28 10/1 10/4 10/8 10/11 10/17 10/19 10/23   Total Weekly Dose 8mg 12mg 19mg 29mg 31mg 34mg 43mg 41mg 45mg   INR 1.3 1.22 1.3 1.8 1.85 1.7 2.91 2.82 3.79   Notes clinic enox enox enox; clinic enox enox        Date 11/8 11/16 12/21 1/9/19 1/16 2/4 3/1 3/22 5/16   Total Weekly Dose 49mg 49mg 49mg 49mg 47mg 49mg 49mg 49mg 49mg   INR 3.0 2.96 3.44 4.4 2.36 2.62 3.02 2.15 2.6   Notes   desvenla   self adj        Date 6/13 6/24 7/10 7/22 8/5 8/26 9/26 10/18 10/28   Total Weekly Dose 49mg 49mg 49mg 49mg 49mg 49mg 49mg 49mg 49mg   INR 2.25 2.98 2.83 2.97 2.9 3.2 3.57 3.12 2.88   Notes       fewer GLV Cymbalta start 9/25; Depakote start 9/5 1x decr dose   *takes warfarin in AM*    Phone Interview:  Verbal Release Authorization: please contact Mr. Hall directly - if unable to reach patient may contact brotherMarko  Tablet Strength: 2mg, and 5mg tablets  Patient Contact Info: [phone]; Susan will call and speak with us  Lab Contact Info: Megan Vu     Patient Findings:  Negatives:  Signs/symptoms of thrombosis, Signs/symptoms of bleeding, Laboratory test error suspected, Change in health, Change in alcohol use, Change in activity, Upcoming invasive procedure, Emergency department visit, Upcoming dental procedure, Missed doses, Extra doses, Change in  medications, Change in diet/appetite, Hospital admission, Bruising, Other complaints     Plan:  1. INR was back WNL yesterday. After consulting with Sondra Brooks, PharmD, instructed Mr. Hall to continue warfarin 7mg oral daily.  2. Repeat INR in two weeks.  3. Verbal information provided over the phone. Manjeet Hall RBV dosing instructions, expresses understanding by teach back, and has no further questions at this time.    Aileen Grullon, MICHELLE  10/29/2019  8:19 AM    Addendum 11/15/19: Patient requests a new rx for warfarin 2mg tablets. Called in today, however, spoke with patient regarding overdue INR. He will plan to go on 11/18.   I, Sondra Brooks, PharmD, have reviewed the note in full and agree with the assessment and plan.  10/29/19  9:52 AM

## 2019-11-22 NOTE — TELEPHONE ENCOUNTER
Clifton Arriaza called about her mother and was looking to speak with the Dr or nurse about a few questions. She would not specify what the questions are.  Please call the patient back

## 2020-02-14 NOTE — PLAN OF CARE
Received patient from ER. Oriented x1 to self, able to state she is in hospital though not which hospital and does not recall events of day. Follows simple commands. Hemodynamically stable. RA. Admission hx completed with daughter at bedside.  Patient resi V-Y Plasty Text: The defect edges were debeveled with a #15 scalpel blade.  Given the location of the defect, shape of the defect and the proximity to free margins an V-Y advancement flap was deemed most appropriate.  Using a sterile surgical marker, an appropriate advancement flap was drawn incorporating the defect and placing the expected incisions within the relaxed skin tension lines where possible.    The area thus outlined was incised deep to adipose tissue with a #15 scalpel blade.  The skin margins were undermined to an appropriate distance in all directions utilizing iris scissors.

## 2020-03-25 NOTE — ED INITIAL ASSESSMENT (HPI)
Darrel Campa   44 y.o.  male  [id number]      Chief Complaint   Patient presents with    Facial Pain     c/o sinus pressure/pain onset a month ago        Subjective:  44 y.o.male is here for a follow up. He has the following chronic/acute medical problems:  Patient Active Problem List   Diagnosis    Chronic left-sided low back pain without sciatica    Tobacco dependence       Patient is here with sinusitis x1 month. Patient was seen in the emergency room on March 7 for sinusitis. He was discharged from the ER on Augmentin for 10 days, Toradol as needed for pain, Sudafed, prednisone 50 mg x 7 days. Patient saw his PCP on March 12 for sinusitis. Patient was treated with Levaquin for 7 days, prednisone 10 mg daily for 10 days, and Azelestine nasal spray. Patient then came to the walk-in clinic on March 16 saw Uriel José. Patient was given Depo-Medrol injection 40 mg in the office. He was sent a prescription over for doxycycline 100 mg twice a day for 10 days and ibuprofen 800 mg tablets every 8 hours as needed for pain. Patient is back today and states his symptoms are barely any better. Sinusitis   This is a new problem. The current episode started more than 1 month ago (1 month ago). The problem is unchanged. There has been no fever. His pain is at a severity of 8/10. Associated symptoms include congestion, coughing, headaches, sinus pressure and sneezing. Pertinent negatives include no chills, diaphoresis, ear pain, hoarse voice, neck pain, shortness of breath, sore throat or swollen glands. Treatments tried: see above in narrative. Improvement on treatment: states barely any better. Review of Systems   Constitutional: Positive for fatigue and fever (states sometimes). Negative for appetite change, chills and diaphoresis. HENT: Positive for congestion, rhinorrhea, sinus pressure, sinus pain and sneezing. Negative for ear pain, hoarse voice, postnasal drip and sore throat.     Respiratory: Patient is a 68 yo  female brought to ED by EMS for urinary catheter placement. Medics report that SNF RN was unable to re-insert mooney catheter. Positive for cough. Negative for chest tightness, shortness of breath and wheezing. Cardiovascular: Negative for chest pain and palpitations. Gastrointestinal: Negative for diarrhea, nausea and vomiting. Musculoskeletal: Negative for neck pain. Skin: Negative for rash. Neurological: Positive for dizziness and headaches. Current Outpatient Medications   Medication Sig Dispense Refill    loratadine (CLARITIN) 10 MG tablet Take 1 tablet by mouth daily 30 tablet 0    fluticasone (FLONASE) 50 MCG/ACT nasal spray 1 spray by Nasal route daily 1 Bottle 0    ibuprofen (ADVIL;MOTRIN) 800 MG tablet Take 1 tablet by mouth every 8 hours as needed for Pain 30 tablet 0    azelastine (ASTELIN) 0.1 % nasal spray 1 spray by Nasal route 2 times daily Use in each nostril as directed 2 Bottle 1    gabapentin (NEURONTIN) 400 MG capsule Take 2 capsules by mouth 3 times daily for 30 days. 90 capsule 1    ketorolac (TORADOL) 10 MG tablet Take 1 tablet by mouth every 6 hours as needed for Pain (Patient not taking: Reported on 3/25/2020) 30 tablet 0    lidocaine viscous hcl (XYLOCAINE) 2 % SOLN solution Take 5 mLs by mouth as needed for Irritation (Patient not taking: Reported on 3/25/2020) 50 mL 0     No current facility-administered medications for this visit. Past medical, family,surgical history reviewed today. Objective:  /88   Pulse 83   Temp 98.3 °F (36.8 °C)   Ht 6' 1\" (1.854 m)   Wt 171 lb (77.6 kg)   SpO2 97%   BMI 22.56 kg/m²   BP Readings from Last 3 Encounters:   03/25/20 120/88   03/16/20 120/72   03/12/20 132/84     Wt Readings from Last 3 Encounters:   03/25/20 171 lb (77.6 kg)   03/16/20 171 lb (77.6 kg)   03/12/20 187 lb (84.8 kg)         Physical Exam  Constitutional:       Appearance: Normal appearance. HENT:      Head: Normocephalic.       Right Ear: Tympanic membrane, ear canal and external ear normal.      Left Ear: Tympanic membrane, ear canal and external ear normal. Nose: Mucosal edema present. No congestion. Right Sinus: Maxillary sinus tenderness and frontal sinus tenderness present. Left Sinus: Maxillary sinus tenderness and frontal sinus tenderness present. Mouth/Throat:      Lips: Pink. Mouth: Mucous membranes are moist.      Pharynx: Oropharynx is clear. Neck:      Musculoskeletal: Neck supple. Cardiovascular:      Rate and Rhythm: Normal rate and regular rhythm. Heart sounds: Normal heart sounds. Pulmonary:      Effort: Pulmonary effort is normal.      Breath sounds: Normal breath sounds. Skin:     General: Skin is warm and dry. Neurological:      Mental Status: He is alert and oriented to person, place, and time. Psychiatric:         Mood and Affect: Mood normal.         Behavior: Behavior normal.         Lab Results   Component Value Date    WBC 7.4 12/31/2019    HGB 15.5 12/31/2019    HCT 45.6 12/31/2019    MCV 94.2 12/31/2019     12/31/2019     Lab Results   Component Value Date     12/31/2019    K 3.7 12/31/2019     12/31/2019    CO2 24 12/31/2019    BUN 17 12/31/2019    CREATININE 0.8 (L) 12/31/2019    GLUCOSE 90 12/31/2019    CALCIUM 9.6 12/31/2019    PROT 6.7 12/31/2019    LABALBU 4.1 12/31/2019    BILITOT 0.4 12/31/2019    ALKPHOS 57 12/31/2019    AST 42 (H) 12/31/2019    ALT 84 (H) 12/31/2019    LABGLOM >60 12/31/2019    GFRAA >60 12/31/2019    AGRATIO 1.6 12/31/2019    GLOB 2.6 12/31/2019     No results found for: CHOL  No results found for: TRIG  Lab Results   Component Value Date    HDL 51 12/31/2019     Lab Results   Component Value Date    LDLCALC 84 12/31/2019     Lab Results   Component Value Date    LABA1C 5.0 12/31/2019     No results found for: TSHFT4, TSH, TSHHS      ASSESSMENT/PLAN:      1. Pansinusitis, unspecified chronicity  Increase fluids.  - CT SINUS W WO CONTRAST; Future  - loratadine (CLARITIN) 10 MG tablet; Take 1 tablet by mouth daily  Dispense: 30 tablet;  Refill: 0  - fluticasone (FLONASE) 50 MCG/ACT nasal spray; 1 spray by Nasal route daily  Dispense: 1 Bottle; Refill: 0    2. Tobacco dependence  Patient counseled in length on smoking cessation including benefits of quitting and health risks of continuing to smoke including but not limited to lung cancer, COPD, risk of coronary artery disease. Patient verbalized understanding today. Medications Discontinued During This Encounter   Medication Reason    doxycycline hyclate (VIBRA-TABS) 100 MG tablet Therapy completed         Care discussed with patient. Questions answered. Patient verbalizes understanding and agrees with plan. After visit summary provided. Advised to call for any problems, questions, or concerns. Return if symptoms worsen or fail to improve.                                              Signed:  ANNALISE Jimenez CNP  03/25/20  11:03 AM

## (undated) NOTE — ED AVS SNAPSHOT
Neris Burroughs   MRN: [id number]    Department:  [facility] Emergency Department   Date of Visit:  8/11/2017           Disclosure     Insurance plans vary and the physician(s) referred by the ER may not be covered by your plan.  Please contact you If you have been prescribed any medication(s), please fill your prescription right away and begin taking the medication(s) as directed    If the emergency physician has read X-rays, these will be re-interpreted by a radiologist.  If there is a significant

## (undated) NOTE — IP AVS SNAPSHOT
Patient Demographics     Address  [address] [place] [address] Dr Robel Beckman [address] Phone  [phone] (Home)  [phone] (Mobile) *Preferred* E-mail Address  [email or web address]      Emergency Contact(s)     Name Relation Home Work Mobile    Tracy Isabelne Daughter   6 Chew 1 tablet by mouth 3 (three) times daily as needed (for blood sugar below 60). HYDROcodone-acetaminophen 5-325 MG Tabs  Commonly known as:  NORCO      Take 1 tablet by mouth every 6 (six) hours as needed for Pain. Franklin Cain MD Order ID Medication Name Action Time Action Reason Comments    [id number] Ketorolac Tromethamine (TORADOL) injection 15 mg 11/21/19 0421 Given      [id number] Ketorolac Tromethamine (TORADOL) injection 15 mg 11/21/19 0824 Given      [id number] Ketorolac Trom Note: If renal impairment is present, use a reference range of 0.37 - 3.10 for Kappa/Lambda FLC ratio. Testing Performed By     Lab - Abbreviation Name Director Address Valid Date Range    [address] [place] - [address] [facility] Caroline Moreland Door • Hyperlipidemia    • Hyperlipidemia LDL goal <70    • Hypertension    • Muscle weakness    • Neuropathy, diabetic (HCC)    • Obesity (BMI 30-39. 9)     BMI 32   • Osteoarthrosis, unspecified whether generalized or localized, unspecified site    • Recurrent ipratropium-albuterol 0.5-2.5 (3) MG/3ML Inhalation Solution, Take 3 mL by nebulization 4 (four) times daily. , Disp: , Rfl:   lisinopril 20 MG Oral Tab, Take 20 mg by mouth 2 (two) times daily. , Disp: , Rfl:   Montelukast Sodium 10 MG Oral Tab, Take 10 mg A comprehensive 10 point review of systems was completed. Pertinent positives and negatives noted in the the HPI. Physical Exam:  Vital signs: Blood pressure 159/58, pulse 67, temperature 98.6 °F (37 °C), temperature source Oral, resp.  rate 20, height Technologist)  Patient presents with left acetabular hip fracture from [facility]. CONCLUSION:  There is a 5.8 x 2.3 cm lytic lesion in the proximal diaphysis of the left femur with smaller lytic lesions proximally.   The differential would i Respiratory acidosis     Hypoglycemic reaction     Acute cystitis without hematuria     Bradycardia     Uncontrolled type 2 diabetes mellitus with other circulatory complication, with long-term current use of insulin     Uncontrolled type 2 diabetes ludivina goals of care and treatments of uncontrolled pain symptoms related to acetabular fracture    History of Present Illness:      Dorothea Royal is a [de-identified]year old female with history of Dementia, DM2 with severe neuropathy, CAD, CHF, HTN, COPD and CKD Stage 3- 50 years ago   • Hyperlipidemia    • Hyperlipidemia LDL goal <70    • Hypertension    • Muscle weakness    • Neuropathy, diabetic (HCC)    • Obesity (BMI 30-39. 9)     BMI 32   • Osteoarthrosis, unspecified whether generalized or localized, unspecified sit Subcutaneous, Q12H  •  Sertraline HCl (ZOLOFT) tab 50 mg, 50 mg, Oral, Nightly  •  [START ON 11/21/2019] gabapentin (NEURONTIN) cap 300 mg, 300 mg, Oral, Daily  •  lisinopril tab 20 mg, 20 mg, Oral, BID  •  QUEtiapine Fumarate (SEROQUEL) tab 25 mg, 25 mg, Xr Femur Min 2 Views Left (cpt=73552)    Result Date: 11/20/2019  CONCLUSION:  Diffuse demineralization of the left femur.   More ill-defined area of intramedullary lucency with some endosteal scalloping involving the proximal left femoral diaphysis with mo 17:05              Review of Systems:      Palliative Care symptom needs assessed:   Symptoms(s): pain  Dyspnea: does not appear tachypneic, unable to to answer  Cough: none noted  Nausea: UTD  Pain: sleepy, no facial grimacing noted    Objective/Physical Goals of Care: I introduced palliative care and reason for consultation. I provided education on the differences between palliative care and hospice care.  I discussed the benefits of palliative care to include assistance with arising symptom management dulce Will give IV Morphine for severe pain prn (30 minutes prior to any movement if possible)  Continue the ordered Acetaminophen for mild pain prn    Advance Care Planning counseling and discussion:   Patient's preference about sharing medical information: roque Start Morphine 0.5 mg IV Q 4 hours for severe pain  Consider giving above medications approximately 30 minutes prior to any movement    4. Psychosocial: Emotional support provided. 5. Disposition: TBD    - Discussed today’s visit with her floor RN. Past Medical History  Past Medical History:   Diagnosis Date   • Anemia    • Anxiety    • Aortic insufficiency    • Asthma    • CKD (chronic kidney disease) stage 3, GFR 30-59 ml/min ([facility] [address]    • CONGESTIVE HEART FAILURE     EF 35% 2007, EF 55% 2009   • COPD Weight Bearing Restriction: L lower extremity           L Lower Extremity: Toe Touch Weight Bearing(for transfers only )    PAIN ASSESSMENT  Rating: Unable to rate  Location: L hip       RANGE OF MOTION AND STRENGTH ASSESSMENT  Upper extremity ROM and stre leaning posteriorly in sitting. Patient returned to supine max AX2, dependent to reposition in bed. Patient voices pain in L hip with mobility, RN aware. [MD.2]     Patient End of Session: In bed;Call light within reach;RN aware of session/findings;SCDs in

## (undated) NOTE — ED AVS SNAPSHOT
[facility] Emergency Department    [place]  [address] [address]    Phone:  [phone]    Fax:  [address]   MRN: [id number]    Department:  [facility] Emergency Department   Date of Visit:  6/19 Pediatric [phone] Emergency Department   [phone]       To Check ER Wait Times:  TEXT 'ERwait' to 17271      Click [email or web address]      Or call [phone]    If you have any problems with your follow-up, please call our case [facility] before you leave. After you leave, you should follow the attached instructions. I have read and understand the instructions given to me by my caregivers. 24-Hour Pharmacies        Pharmacy Address Phone Number   [phone] MyChart     Visit [employer]  You can access your MyChart to more actively manage your health care and view more details from this visit by going to [email or web address].   If you've recently had a stay at the Hospital you can access your discharge ins

## (undated) NOTE — ED AVS SNAPSHOT
Trudi Miner   MRN: [id number]    Department:  [facility] Emergency Department   Date of Visit:  8/4/2017           Disclosure     Insurance plans vary and the physician(s) referred by the ER may not be covered by your plan.  Please contact your If you have been prescribed any medication(s), please fill your prescription right away and begin taking the medication(s) as directed    If the emergency physician has read X-rays, these will be re-interpreted by a radiologist.  If there is a significant

## (undated) NOTE — ED AVS SNAPSHOT
[facility] Emergency Department    [place]  [address] [address]    Phone:  [phone]    Fax:  [address] [place] [address]   MRN: [id number]    Department:  [facility] Emergency Department   Date of Visit:  6/19 IF THERE IS ANY CHANGE OR WORSENING OF YOUR CONDITION, CALL YOUR PRIMARY CARE PHYSICIAN AT ONCE OR RETURN IMMEDIATELY TO THE EMERGENCY DEPARTMENT.     If you have been prescribed any medication(s), please fill your prescription right away and begin taking t

## (undated) NOTE — IP AVS SNAPSHOT
[address]            (For Outpatient Use Only) Initial Admit Date: 4/10/2018   Inpt/Obs Admit Date: Inpt: 4/10/18 / Obs: N/A   Discharge Date:    Robert Binet:  [de-identified]   MRN: [de-identified]   CSN: [id number]        ENCOUNTER  Patient April 11, 2018

## (undated) NOTE — ED AVS SNAPSHOT
Ayo Collier   MRN: [id number]    Department:  [facility] Emergency Department   Date of Visit:  10/28/2018           Disclosure     Insurance plans vary and the physician(s) referred by the ER may not be covered by your plan.  Please contact yo tell this physician (or your personal doctor if your instructions are to return to your personal doctor) about any new or lasting problems. The primary care or specialist physician will see patients referred from the [facility] Emergency Department.  Judi Mir

## (undated) NOTE — ED AVS SNAPSHOT
Bryna Leyden   MRN: [id number]    Department:  [facility] Emergency Department   Date of Visit:  1/29/2019           Disclosure     Insurance plans vary and the physician(s) referred by the ER may not be covered by your plan.  Please contact you tell this physician (or your personal doctor if your instructions are to return to your personal doctor) about any new or lasting problems. The primary care or specialist physician will see patients referred from the [facility] Emergency Department.  Salvadore Skiff

## (undated) NOTE — IP AVS SNAPSHOT
Patient Demographics     Address  [address] [place] [address] Phone  [phone] (Home)  [phone] (Mobile) *Preferred* E-mail Address  [email or web address]      Emergency Contact(s)     Name Relation Home Work [address] [facility] Follow-up Information     Steffi Clinton MD In 3 weeks. Specialty:  ENDOCRINOLOGY  Why:  Follow up with Dr. Rigo Chin or one of her associates in 3-4 weeks for diabetes.  TELL THEM SHE IS FOLLOWING UP FROM THE HOSPITAL  Contact information:  [address] [facility] Take 1 tablet (20 mg total) by mouth 2 (two) times daily. Edwar Baeza MD         loratadine 10 MG Tabs  Commonly known as:  CLARITIN  Next dose due:  5/06      Take 10 mg by mouth daily.           Methenamine Hippurate 1 g Tabs  Commonly known as:  HIP [id number] Potassium Chloride ER (K-DUR) CR tab 10 mEq 05/05/18 0925 Given      [id number] aspirin tab 325 mg 05/04/18 2216 Given      [id number] escitalopram (LEXAPRO) tablet 10 mg 05/05/18 0900 Given      [id number] furosemide (LASIX) tab 40 mg 05/05/18 092 [id number] Insulin Aspart Pen (NOVOLOG) 100 UNIT/ML flexpen 1-10 Units 05/05/18 1651 Given      [id number] Insulin Aspart Pen (NOVOLOG) 100 UNIT/ML flexpen 1-68 Units 05/05/18 0958 Given      [id number] Insulin Aspart Pen (NOVOLOG) 100 UNIT/ML flexpen 1-68 U Result status: Final result   Ordering provider:  Madyson Mcgill MD  05/04/18 2300 Resulting lab:  [facility]   Narrative: The following orders were created for panel order CBC WITH DIFFERENTIAL WITH PLATELET.   Procedure                               Abno Borderline high:   139-159 mg/dL     High:              160-189 mg/dL          Very high:         > or = 190 mg/dL                Testing Performed By     Lab - Abbreviation Name Director Address Valid Date Range    [address] - [facility] [place] episode last month. Her insulin was reduced . Her BS last 2 weeks were high mainly post prandial BS. It was 350 - 500 .  Her eating habits is not consistent, Yesterday staff given her meal time insulin before she ate breakfast, she did not eat anything for reports that she has never smoked. She has never used smokeless tobacco. She reports that she does not drink alcohol or use drugs. [CJ.2]    Allergies:[CJ.1]    Ciprofloxacin           Dyspnea  Pcn [Penicillins]       HIVES    Comment:Tolerated cephalosp times daily. Disp:  Rfl:  Past Week at Unknown time   carvedilol 6.25 MG Oral Tab Take 1 tablet (6.25 mg total) by mouth 2 (two) times daily with meals. Disp: 180 tablet Rfl: 2 5/2/2018 at 0900   loratadine 10 MG Oral Tab Take 10 mg by mouth daily.  Disp: Integument: No lesions. No erythema. Psychiatric: Appropriate mood and affect.     Laboratory Data:[CJ.1]     Lab Results  Component Value Date   WBC 11.3 05/04/2018   HGB 12.1 05/04/2018   HCT 37.9 05/04/2018   .0 05/04/2018   CREATSERUM 1.42 05/04 Xr Chest Ap Portable  (cpt=71045)    Result Date: 4/10/2018  PROCEDURE:  XR CHEST AP PORTABLE  (CPT=71045)  TECHNIQUE:  AP chest radiograph was obtained. COMPARISON:  [facility] , XR CHEST PA + LAT CHEST (CPT=71020), 12/25/2016, 11:03.   [facility] , XR CHEST AP P Urinary tract infection without hematuria     Urinary tract infection without hematuria, site unspecified     Dyspnea, unspecified type     ESR raised     Edema     Dyslipidemia     Azotemia     Metabolic alkalosis     Respiratory acidosis     Hyperglyc Type 2 DM[SB.2]. The diagnosis of[SB. 1] Type 2 DM[SB. 2] was established[SB. 1] in her age 45s.  Per the med list in the chart, she is on Lantus 25 Units twice a day and the following Novolog scale    130-179=5 units   180-230=10 units   231-280=15 units Past Surgical History:[SB.4]  No date: ANGIOGRAM  10/24/07: ANGIOPLASTY (CORONARY)      Comment: 90% LADp (3.0x16mm Taxus), 90% D1 (2.5x12mm                Vision)  10/29/07: ANGIOPLASTY (CORONARY)      Comment: 100% diagonal, unable to wire  No date: APPE lisinopril (PRINIVIL,ZESTRIL) tab 20 mg 20 mg Oral BID   [COMPLETED] lisinopril (PRINIVIL,ZESTRIL) tab 10 mg 10 mg Oral Once   furosemide (LASIX) tab 40 mg 40 mg Oral BID   Potassium Chloride ER (K-DUR) CR tab 10 mEq 10 mEq Oral BID   glucose (DEX4) oral l Social History Main Topics    Smoking status: Never Smoker                                                              Smokeless tobacco: Never Used                        Alcohol use:  No              Drug use: No            Other Topics            Concer 4.0 - 13.0 x10(3) uL 11.3   RBC      3.80 - 5.10 x10(6)uL 4.14   Hemoglobin      12.0 - 16.0 g/dL 12.1   Hematocrit      34.0 - 50.0 % 37.9   Platelet Count      662.5 - 450.0 10(3)uL 195. 0[SB. 6]         Assessment and Plan:    1. Type[SB.1] 2[SB. 4] DM Physical Therapy Notes (last 72 hours) (Notes from 5/2/2018  5:42 PM through 5/5/2018  5:42 PM)      Physical Therapy Note signed by Danny Guan PT at 5/4/2018  3:39 PM  Version 1 of 1    Author:  Danny Guan PT Service:  Rehab Author Type: chronic mooney catheter since 2011       Past Surgical History  Past Surgical History:  No date: ANGIOGRAM  10/24/07: ANGIOPLASTY (CORONARY)      Comment: 90% LADp (3.0x16mm Taxus), 90% D1 (2.5x12mm                Vision)  10/29/07: ANGIOPLASTY (CORONARY) ADDITIONAL TESTS                                    NEUROLOGICAL FINDINGS                      ACTIVITY TOLERANCE  Room air  No shortness of breath  Heart Rate: with activity high 60s  Blood Pressure: 146/57 (after administration of IV BP meds per RN)    A Pt instructed in PT role, POC, d/c planning, DME needs, fall risk precautions, importance of regular mobility, negative effects of bedrest, risk for functional decline, and energy conservation techniques.         Exercise/Education Provided:  Bed mobility mechanics; Coordination; Endurance; Energy conservation;Patient education; Family education; Neuromuscular re-educate;Range of motion;Strengthening;Transfer training;Balance training  Rehab Potential : Guarded  Frequency (Obs): 5x/week  Number of Visits to Meet

## (undated) NOTE — MR AVS SNAPSHOT
[address] [id number]               Thank you for choosing us for your health care visit with Radha Randall MD.  We are glad to serve you and happy to provide you with this summary family member will be picking up prescription, office must be given name of individual in advance and they must present an ID as well. ? The name of the person picking up your prescription must be documented in your chart.   Scheduling Tests    If your phy Ciprofloxacin Dyspnea    Pcn [Penicillins] Hives    Tolerated cephalosporins 2/12/2014    Sulfa Antibiotics Hives                Today's Vital Signs     BP Pulse Height Weight BMI    104/74 mmHg 60 65\" 220 lb 36.61 kg/m2         Current Medications loratadine 10 MG Tabs   Take 10 mg by mouth daily. Commonly known as:  CLARITIN           Methenamine Hippurate 1 g Tabs   Take 1 tablet (1 g total) by mouth daily.  One daily   Commonly known as:  HIPREX           Montelukast Sodium 10 MG Tabs   Take 10 [email or web address]. If you've recently had a stay at the Hospital you can access your discharge instructions in [employer] by going to Visits < Admission Summaries.  If you've been to the Emergency Department or your doctor's office, you can view yo

## (undated) NOTE — ED AVS SNAPSHOT
Remi Ragsdale   MRN: [id number]    Department:  [facility] Emergency Department   Date of Visit:  8/24/2018           Disclosure     Insurance plans vary and the physician(s) referred by the ER may not be covered by your plan.  Please contact you tell this physician (or your personal doctor if your instructions are to return to your personal doctor) about any new or lasting problems. The primary care or specialist physician will see patients referred from the [facility] Emergency Department.  Anibal Vazquez

## (undated) NOTE — IP AVS SNAPSHOT
Patient Demographics     Address  [address] [place] [address] Road [address] Phone  [phone] (Home)  [phone] (Mobile) *Preferred* E-mail Address  [email or web address]      Emergency Contact(s)     Name Relation Home Work [address] Take 1 tablet (5 mg total) by mouth once daily. Daniela Javier MD         aspirin 325 MG Tabs  Next dose due:  8/31/18 at 9 AM      Take 325 mg by mouth daily.           cyanocobalamin 1000 MCG/ML Soln  Commonly known as:  VITAMIN B12  Next dose due:  Res Potassium Chloride ER 10 MEQ Tbcr  Commonly known as:  K-DUR      Take 10 mEq by mouth 2 (two) times daily.           PROAIR  (90 Base) MCG/ACT Aers  Generic drug:  Albuterol Sulfate HFA      Inhale 2 puffs into the lungs every 6 (six) hours as neede [id number] Heparin Sodium (Porcine) 5000 UNIT/ML injection 5,000 Units 08/30/18 1355 Given            LEFT UPPER ARM     Order ID Medication Name Action Time Action Reason Comments    [id number] Insulin Aspart Pen (NOVOLOG) 100 UNIT/ML flexpen 4-20 Units 08 Order Status:  Completed Lab Status:  Preliminary result Updated:  08/29/18 1500    Specimen:  Blood from Blood,peripheral      Blood Culture Result No Growth 2 Days    STOOL CULTURE W/SHIGATOXIN Once [[id number]] Collected:  08/27/18 1318    Order Status: Order Status:  Completed Lab Status:  Final result Updated:  08/27/18 1540    Specimen:  Stool from Stool      Occult Blood Result Negative for Occult Blood         H&P - H&P Note      H&P signed by Tavo Gerber MD at 8/27/2018  4:57 PM  Version 2 of • Osteoarthrosis, unspecified whether generalized or localized, unspecified site    • Recurrent UTI    • Type 2 diabetes mellitus ([facility] [address]     Dx in her age 45s   • Urinary retention 10/8/2014    chronic mooney catheter since 2011        Past Surgical History: once daily. Disp: 90 tablet Rfl: 1   AmLODIPine Besylate 5 MG Oral Tab Take 1 tablet (5 mg total) by mouth once daily. Disp: 90 tablet Rfl: 1   gabapentin 300 MG Oral Cap Take 1 capsule (300 mg total) by mouth 3 (three) times daily with meals.  Disp: 270 ca Pertinent positives and negatives noted in the HPI. Physical Exam:    /67   Pulse 53   Temp 98.5 °F (36.9 °C) (Temporal)   Resp 15   SpO2 100%   General: No acute distress. Alert, not oriented   HEENT: R forehead bruise. Moist mucous membranes.  EO 3. TONE on CKD   1. Monitor with fluids   2. Hold acei  4. Demenia  5. DM  1. Hyperglycemia protocol   6.  CAD     Quality:  · DVT Prophylaxis: heparin   · CODE status: DNR   · Urbina: none    Plan of care discussed with patient, daughter, er     Rosalba Shuck • CORONARY ARTERY DISEASE 2007    ALEKS to LAD, BMS to diagonal   • Coronary atherosclerosis    • Dementia    • History of blood transfusion     50 years ago   • Hyperlipidemia LDL goal <70    • Hypertension    • Neuropathy, diabetic ([facility] [address]    • Obesity (BMI BASAGLAR KWIKPEN 100 UNIT/ML Subcutaneous Solution Pen-injector Take 44 Units with breakfast and bedtime Disp: 25 pen Rfl: 1   EQ ALLERGY RELIEF 10 MG Oral Tab TAKE 1 TABLET BY MOUTH ONCE DAILY Disp: 90 tablet Rfl: 0   Montelukast Sodium 10 MG Oral Tab [facility] Cholecalciferol (VITAMIN D) 2000 UNITS Oral Cap Take 2,000 Units by mouth every morning. Disp:  Rfl:    aspirin 325 MG Oral Tab Take 325 mg by mouth nightly.    Disp:  Rfl:        Review of Systems:   A comprehensive 14 point review of systems was complet 1. Pulseless bradycardia - possibly due to infection, hypovolemia   1. Cardiology, ICU Care   2. Possible Sepsis with profuse diarrhea  1. Stool Studies, Cultures   2. Cont Abx w/vancomycin   3. IVF  3. TONE on CKD   1. Monitor with fluids   2.  Hold acei  4 Chronic systolic CHF-documented resolution. Echo currently at bedside, EF appears 60% without wall motion abn. Diarrhea-C. Diff (-) by rapid PCR. Stool bag in place.     DVT prophylaxis-heparin SQ    Disposition- DNR[SC.2]      Plan:  -[SC.1]urine for CAD-cath 10/24/07 90% LADp (3.0x16mm Taxus), 90% D1 (2.5x12mm Vision)          Cath 10/29/07 100% occlusion of D1 stent with clot, unable to wire  CHF-echo 2007 EF 35% with apical akinesis, echo 2009 EF 55% with apical akinesis  Aortic insufficiency-mild t Pcn [Penicillins]       HIVES    Comment:Tolerated cephalosporins 2/12/2014  Sulfa Antibiotics       HIVES    Medications:    Current Facility-Administered Medications:   •  Loperamide HCl (IMODIUM) cap 2 mg, 2 mg, Oral, QID PRN  •  peppermint oil liquid 1 •  Insulin Aspart Pen (NOVOLOG) 100 UNIT/ML flexpen 4-20 Units, 4-20 Units, Subcutaneous, TID CC and HS    Review of Systems:  All systems were reviewed and are negative except as described above in HPI.     Physical Exam:  Temp:  [98.1 °F (36.7 °C)-99.7 °F SC.1 Anahi Medina MD on 8/28/2018  8:08 AM  SC.2 - Buck Monsivais MD on 8/28/2018 12:55 PM                     D/C Summary     No notes of this type exist for this encounter.       Imaging Results (HF patients)    Chest X-Ray Results (HF patients only) thickness was normal.    The estimated ejection fraction was 60-65%. Doppler parameters are    consistent with abnormal left ventricular relaxation - grade 1 diastolic    dysfunction. 2. Aortic valve: There was mild stenosis. Mild regurgitation.  3. Mitral Transvalvular velocity was within the normal range. There was no evidence for stenosis. There was mild regurgitation. Pulmonic valve:    Structurally normal valve. Cusp separation was normal. Doppler:  Transvalvular velocity was within the normal range. Reference  LVOT area                                       3.46  cm^2     ---------  LVOT ID                                         2.1   cm       ---------   Aortic valve                                    Value          Reference  Aortic valve mean velo ---------  PA pressure, ED, DP                             7     mm Hg    ---------   Tricuspid valve                                 Value          Reference  Tricuspid regurg peak velocity                  2.3   m/sec    ---------  Tricuspid peak RV-RA g ---------------------------------------------------------------------------- Procedure information:  A transthoracic complete 2D study was performed. Additional evaluation included M-mode, complete spectral Doppler, and color Doppler. Inpatient. 469.  Young separation was normal. Doppler:  Transvalvular velocity was minimally increased. There was mild to moderate regurgitation. Peak gradient (D): 5mm Hg. Aortic valve:   Trileaflet; mildly thickened leaflets. Doppler: There was mild stenosis.     Mild reg ---------  LV E/e', medial                                 16             ---------  LV e', average                                  0.066 m/sec    ---------  LV E/e', average                                16             ---------   Ventricular septum Reference  Mitral E-wave peak velocity                     1.08  m/sec    ---------  Mitral A-wave peak velocity                     1.25  m/sec    ---------  Mitral peak gradient, D                         5     mm Hg    ---------  Mitral E/A ratio, peak Addendum    :  Sudheer Rose PT (Physical Therapist)    Related Notes:  Original Note by Sudheer Rose PT (Physical Therapist) filed at 8/29/2018 10:42 AM        PHYSICAL THERAPY TREATMENT NOTE - INPATIENT    Room Number: 4126/9961-U     Se chronic mooney catheter since 2011       Past Surgical History  Past Surgical History:  No date: ANGIOGRAM  10/24/07: ANGIOPLASTY (CORONARY)      Comment: 90% LADp (3.0x16mm Taxus), 90% D1 (2.5x12mm                Vision)  10/29/07: ANGIOPLASTY (CORONARY) AM-PAC Score:  Raw Score: 9   PT Approx Degree of Impairment Score: 81.38%   Standardized Score (AM-PAC Scale): 30.55   CMS Modifier (G-Code): CM    FUNCTIONAL ABILITY STATUS  Gait Assessment   Gait Assistance: Not tested  Distance (ft): 0  Assistive Devic PT Treatment Plan: Bed mobility; Endurance;Strengthening;Transfer training;Balance training  Rehab Potential : Good  Frequency (Obs): 3x/week    CURRENT GOALS     Goal #1 Patient is able to demonstrate supine - sit EOB @ level: minimum assistance      Goal Active Problems:    Bradycardia    Acute kidney injury ([facility] [address]    Metabolic acidosis    Hyperglycemia    Sepsis (HCC)    Diarrhea, unspecified type    Hypotension, unspecified hypotension type      Past Medical History  Past Medical History:   Diagnosis Date BALANCE                                                                                                                     Static Sitting: Fair  Dynamic Sitting: Fair -           Static Standing: Poor -  Dynamic Standing: Poor -    ACTIVITY TOLERANCE  Radha Lower Extremity Alternating marching  Ankle pumps  LAQ     Upper Extremity      Position Sitting     Repetitions   12   Sets   1     Patient End of Session: In bed;Needs met;Call light within reach;RN aware of session/findings; All patient questions and con Physician Order: PT Eval and Treat    Presenting Problem: Possible sepsis due to profound diarrhea; resolved with IVF, no pressors  Reason for Therapy: Mobility Dysfunction and Discharge Planning    History related to current admission:    Pt is 65 year ol Comment: 90% LADp (3.0x16mm Taxus), 90% D1 (2.5x12mm                Vision)  10/29/07: ANGIOPLASTY (CORONARY)      Comment: 100% diagonal, unable to wire  No date: APPENDECTOMY  No date: BACK SURGERY  No date: CATH DRUG ELUTING STENT  No date: CHOLECYS Blood Pressure: 168/70 in sitting. AM-PAC '6-Clicks' INPATIENT SHORT FORM - BASIC MOBILITY  How much difficulty does the patient currently have. ..  -   Turning over in bed (including adjusting bedclothes, sheets and blankets)?: A Lot   -   Sitting down Educ: activity recommendations, role of inpt PT, DC recommendation, assessment findings, goals and expectations. Functional activity tolerated  Transfer training    Patient End of Session: In bed; With [address] staff;Needs met;Call light within reach;RN awar assistance level: maximum assistance of 1. Goal #3      Goal #4    Goal #5    Goal #6    Goal Comments: Goals established on 8/28/2018[CK. 1]     Attribution Pitts    CK. 1 - Emily Mcgee PT on 8/28/2018  2:46 PM                        Occupati • Aortic insufficiency    • Asthma    • CKD (chronic kidney disease) stage 3, GFR 30-59 ml/min ([facility])    • CONGESTIVE HEART FAILURE     EF 35% 2007, EF 55% 2009   • COPD    • CORONARY ARTERY DISEASE 2007    ALEKS to LAD, BMS to diagonal   • Coronary atheroscl PAIN ASSESSMENT             COGNITION[AO.1]  Alert, following simple motor commands, oriented to self and \"hospital\".     Not aware of current situation, date or PLOF[AO.2]    RANGE OF MOTION AND STRENGTH ASSESSMENT  Upper extremity ROM is with Patient is a 65year old female admitted on 8/27/2018 for[AO.1] unresponsive; bradycardia, dehydration[AO.2]. Complete medical history and occupational profile noted above. Functional outcome measures completed include[AO.1]:   The AM-CHUY ' '6-Clicks' Inp Patient was able to achieve the following goals:  Patient able to toilet transfer:[AO.1] at previous functional level[AO.2]  Patient able to dress lower extremities:[AO.1] at previous functional level[AO.2]  Patient/Caregiver able to demonstrate safety wit

## (undated) NOTE — IP AVS SNAPSHOT
Patient Demographics     Address  [address] [place] [address] [address]  Nerissa Lemus [address] Phone  [phone] (Home)  [phone] (Mobile) *Preferred* E-mail Address  [email or web address]      Emergency Contact(s)     Name Relation Home Work Mobile    Monica Isabel Take 300 mg by mouth 3 (three) times daily.           insulin aspart 100 UNIT/ML Soln  Commonly known as:  Rashmi Santoss      Per SS: 130-179=5 units 180-230=10 units 231-280=15 units 281-330=20 units 331-380=33 units 381-430=35 units   Angie Pierce MD [id number] Pantoprazole Sodium (PROTONIX) EC tab 20 mg 04/11/18 0608 Given      [id number] Potassium Chloride ER (K-DUR) CR tab 10 mEq 04/10/18 2225 Given      [id number] Potassium Chloride ER (K-DUR) CR tab 10 mEq 04/11/18 0834 Given      [id number] Meeta Blood — 04/11/18 0555          Components    Component Value Reference Range [facility]   Glucose 148 70 - 99 mg/dL [facility]   BUN 33 8 - 20 mg/dL [facility]   Creatinine 1.54 0.55 - 1.02 mg/dL [facility]   GFR, Non- 32 >=60 L [facility] Component Value Reference Range [facility] Lab   WBC Urine >50 <5 /HPF [facility]   RBC URINE >10 0 - 2 /HPF [facility]   Bacteria Urine None Seen None Seen — [facility]   Squamous Epi.  Cells Large Small /LPF [facility]   Renal Tubular Epithelial Cells Non Components    Component Value Reference Range Flag Lab   Hold Gold Auto Resulted — — THE [facility]            URINALYSIS WITH CULTURE REFLEX [[id number]] (Abnormal)  Resulted: 04/10/18 7077, Result status: Final result   Ordering provider:  Jose Greenberg account for elevation of PCT >0.50 ng/mL. PCT levels rise after a few hours, peak at the 12-24 hours, then fall at a 24h half-life, while on appropriate antibiotics or after the inflammatory insult is eliminated.  A sustained elevated or rising PCT in the s Component Value Reference Range Flag Lab   Glucose 147 70 - 99 mg/dL [facility]   BUN 35 8 - 20 mg/dL [facility]   Creatinine 1.71 0.55 - 1.02 mg/dL [facility]   GFR, Non- 28 >=60 L [facility]   GFR, -American 33 >=60 L [facility] Ordering provider:  Eulogio Sanabria MD  04/10/18 9584 Resulting lab:  [facility]   Narrative: The aPTT Heparin Therapeutic Range is approximately 65- 104 seconds. The therapeutic range has been validated against 0.3-0.7 heparin anti-Xa units/mL.       Federica Payne Specimen:  Blood from Blood,peripheral       H&P Note     No notes of this type exist for this encounter. D/C Summary     No notes of this type exist for this encounter.       Imaging Results (HF patients)    Chest X-Ray Results (HF patients only)

## (undated) NOTE — IP AVS SNAPSHOT
[address]            (For Outpatient Use Only) Initial Admit Date: 3/12/2019   Inpt/Obs Admit Date: Inpt: 3/12/19 / Obs: N/A   Discharge Date:    [place]:  [de-identified]   MRN: [de-identified]   CSN: [id number]        ENCOUNTER  Patient Hospital Account Financial Class: Medicare    March 17, 2019

## (undated) NOTE — IP AVS SNAPSHOT
Patient Demographics     Address  [address] [place] [address] [address] Barbi Abraham [address] Phone  [phone] (Home)  [phone] (Mobile) *Preferred* E-mail Address  [email or web address]      Emergency Contact(s)     Name Relation Home Work Mobile    Monica Isabel Daughter   5 Take 1,000 mcg by mouth daily.     [    ]   [    ]   [    ]   [    ]     cyanocobalamin 1000 MCG/ML Soln  Commonly known as:  VITAMIN B12      INJECT 1ML INTO THE MUSCLE ONCE EVERY 30 DAYS   Hollis Roldan MD   [    ]   [    ]   [    ]   [    ]     Raheel Schwarz Tameka Ortiz MD   [    ]   [    ]   [    ]   [    ]     Carole Benoit [id number] UNIT/GM Powd  Generic drug:  Radha Lai MD   [    ]   [    ]   [    ]   [    ]     Potassium Chloride ER 10 MEQ T [id number] Methenamine Hippurate (HIPREX) tab 1 g 03/17/19 0848 Given      [id number] Pantoprazole Sodium (PROTONIX) EC tab 40 mg 03/17/19 0604 Given      [id number] QUEtiapine Fumarate (SEROQUEL) tab 25 mg 03/16/19 2137 Given      [id number] Sertraline HCl ( Most Recent Value   Patient Weight  84.4 kg (186 lb 1.6 oz)      Lab Results Last 24 Hours    No matching results found     Microbiology Results (All)     Procedure Component Value Units Date/Time    Blood Culture JACKYQ X 2 [[id number]] Collected:  03/13/19 Gram positive cocci in clusters by gram stain, not Staph aureus or MRSA by PCR    Susceptibility      Staphylococcus hominis     Not Specified (Preliminary)    Cefazolin  Sensitive    Clindamycin <=0.25  Sensitive    Erythromycin >=8  Resistant    Genta WC bound with chronic mooney from NH send to ER because of lethargy and low BP. She was not eating / drinking last few days. Her mooney came off by itself 2 days ago, nursing staff was not able to put the mooney back. They reported stool in the vagina also.  Her Problem Relation Age of Onset   • Stroke Son    • Diabetes Father    • Diabetes Sister       reports that  has never smoked. she has never used smokeless tobacco. She reports that she does not drink alcohol or use drugs.     Allergies:    Ciprofloxacin every shift) Disp: 1 Bottle Rfl: 1    lisinopril 20 MG Oral Tab Take 1 tablet (20 mg total) by mouth 2 (two) times daily.  (Patient taking differently: Take 20 mg by mouth daily.  ) Disp: 180 tablet Rfl: 0    insulin glargine 100 UNIT/ML Subcutaneous Soluti QUETIAPINE FUMARATE 50 MG Oral Tab TAKE 1 TABLET BY MOUTH NIGHTLY Disp: 30 tablet Rfl: 2    simvastatin 20 MG Oral Tab TAKE 1 TABLET BY MOUTH ONCE DAILY*FASTING LAB WORKS REQUIRED FOR REFILLS* Disp: 90 tablet Rfl: 3    triple antibiotic 3.5-400-5000 Extern CA 7.8 03/13/2019    ALB 1.8 03/13/2019    ALKPHO 84 03/13/2019    BILT 0.2 03/13/2019    TP 5.9 03/13/2019    AST 10 03/13/2019    ALT 8 03/13/2019    PGLU 140 03/13/2019       Imaging:  Ct Brain Or Head ([id number])    Result Date: 3/12/2019  PROCEDURE:  CT TECHNIQUE:  Unenhanced multislice CT scanning was performed from the dome of the diaphragm to the pubic symphysis. Dose reduction techniques were used.  Dose information is transmitted to the [facility] ([facility] [employer] [facility]) Aleksandra Cabrera [address] [place] [address] Radiology Elias above for further details.     Dictated by: Lisa Medeiros MD on 3/12/2019 at 17:05     Approved by: Lisa Medeiros MD            Xr Chest Ap Portable  (cpt=71045)    Result Date: 3/12/2019  PROCEDURE:  XR CHEST AP PORTABLE  (CPT=71045)  TECHNIQUE:  AP ch Renal insufficiency     Memory deficits     Dementia     Gait disorder     B12 deficiency     Pyelonephritis     Acute urinary retention     Urinary tract infection without hematuria     Urinary tract infection without hematuria, site unspecified     Dy No notes of this type exist for this encounter. Occupational Therapy Notes (last 72 hours) (Notes from 3/14/2019  4:29 PM through 3/17/2019  4:29 PM)     No notes of this type exist for this encounter.       Video Swallow Study Notes     No notes of t